# Patient Record
Sex: FEMALE | Race: BLACK OR AFRICAN AMERICAN | NOT HISPANIC OR LATINO | Employment: FULL TIME | URBAN - METROPOLITAN AREA
[De-identification: names, ages, dates, MRNs, and addresses within clinical notes are randomized per-mention and may not be internally consistent; named-entity substitution may affect disease eponyms.]

---

## 2019-03-20 ENCOUNTER — TELEPHONE (OUTPATIENT)
Dept: INTERNAL MEDICINE | Facility: CLINIC | Age: 44
End: 2019-03-20

## 2019-03-20 NOTE — TELEPHONE ENCOUNTER
Left message on  to call clinic back in regards to rescheduling her apt from Saturday 3/30/19. Dr. Ferrara will not be in clinic on that day.

## 2019-03-29 ENCOUNTER — TELEPHONE (OUTPATIENT)
Dept: INTERNAL MEDICINE | Facility: CLINIC | Age: 44
End: 2019-03-29

## 2019-03-29 NOTE — TELEPHONE ENCOUNTER
----- Message from Lyndsey Shankar sent at 3/29/2019  9:35 AM CDT -----  Contact: self  Type:  Needs Medical Advice    Who Called: pt  Symptoms (please be specific): n/a   How long has patient had these symptoms:n/a  Pharmacy name and phone #:n/a  Would the patient rather a call back or a response via [facility]? Call back  Best Call Back Number: [phone]  Additional Information: requesting call back regarding questions regarding why appt was cancelled.      Thanks,  Lyndsey Shankar

## 2019-03-29 NOTE — TELEPHONE ENCOUNTER
Spoke to patient and was advised that she had an appointment on 03/30/2019, but it was canceled and she wants to know why.  I informed the patient that the appointment was canceled due to the provider is not going to be in on that Saturday.  I offered to reschedule for another Saturday or another day.  The patient stated that she was off on Monday and requested an appointment for Monday.  While searching the appointments, patient hung the phone up.  Called the patient back and received no answer.  Left her a message.

## 2019-04-01 ENCOUNTER — TELEPHONE (OUTPATIENT)
Dept: INTERNAL MEDICINE | Facility: CLINIC | Age: 44
End: 2019-04-01

## 2019-04-01 NOTE — TELEPHONE ENCOUNTER
Pt called stating her appt for last sat was cancelled and she didn't know why but wanted another appt to est  Care with Dr Ferrara. Informed pt I was not sure why appt was cancelled but would be more than happy to assist her with making another appt with same provider. Made pt appt for same provider for same day. Pt had no further questions.

## 2019-04-01 NOTE — TELEPHONE ENCOUNTER
----- Message from Saumya Atkins sent at 4/1/2019  8:27 AM CDT -----  Contact: Pt  Type:  Sooner Apoointment Request    Caller is requesting a sooner appointment.  Caller declined first available appointment listed below.  Caller will not accept being placed on the waitlist and is requesting a message be sent to doctor.  Name of Caller: Pt   When is the first available appointment? n/a  Symptoms: EST Care/physical exam  Would the patient rather a call back or a response via [facility]? Call back  Best Call Back Number: [phone] (home)   Additional Information:  The pt is calling back to reschedule her appt and would like to get an explanation concerning why her appt was cancelled last week,please advise.

## 2019-04-03 ENCOUNTER — HOSPITAL ENCOUNTER (EMERGENCY)
Facility: HOSPITAL | Age: 44
Discharge: HOME | End: 2019-04-03
Attending: FAMILY MEDICINE
Payer: COMMERCIAL

## 2019-04-03 VITALS
WEIGHT: 133 LBS | HEART RATE: 101 BPM | DIASTOLIC BLOOD PRESSURE: 78 MMHG | RESPIRATION RATE: 18 BRPM | HEIGHT: 67 IN | OXYGEN SATURATION: 100 % | SYSTOLIC BLOOD PRESSURE: 134 MMHG | BODY MASS INDEX: 20.88 KG/M2 | TEMPERATURE: 99 F

## 2019-04-03 DIAGNOSIS — R07.9 CHEST PAIN: ICD-10-CM

## 2019-04-03 DIAGNOSIS — R52 GENERALIZED BODY ACHES: Primary | ICD-10-CM

## 2019-04-03 LAB
ALBUMIN SERPL BCP-MCNC: 4.4 G/DL (ref 3.5–5.2)
ALP SERPL-CCNC: 80 U/L (ref 55–135)
ALT SERPL W/O P-5'-P-CCNC: 16 U/L (ref 10–44)
ANION GAP SERPL CALC-SCNC: 11 MMOL/L (ref 8–16)
AST SERPL-CCNC: 14 U/L (ref 10–40)
BASOPHILS # BLD AUTO: 0.01 K/UL (ref 0–0.2)
BASOPHILS NFR BLD: 0.1 % (ref 0–1.9)
BILIRUB SERPL-MCNC: 0.4 MG/DL (ref 0.1–1)
BNP SERPL-MCNC: 11 PG/ML (ref 0–99)
BUN SERPL-MCNC: 6 MG/DL (ref 6–20)
CALCIUM SERPL-MCNC: 9.7 MG/DL (ref 8.7–10.5)
CHLORIDE SERPL-SCNC: 101 MMOL/L (ref 95–110)
CO2 SERPL-SCNC: 25 MMOL/L (ref 23–29)
CREAT SERPL-MCNC: 0.7 MG/DL (ref 0.5–1.4)
D DIMER PPP IA.FEU-MCNC: <0.19 MG/L FEU
DIFFERENTIAL METHOD: ABNORMAL
EOSINOPHIL # BLD AUTO: 0 K/UL (ref 0–0.5)
EOSINOPHIL NFR BLD: 0.6 % (ref 0–8)
ERYTHROCYTE [DISTWIDTH] IN BLOOD BY AUTOMATED COUNT: 13.2 % (ref 11.5–14.5)
EST. GFR  (AFRICAN AMERICAN): >60 ML/MIN/1.73 M^2
EST. GFR  (NON AFRICAN AMERICAN): >60 ML/MIN/1.73 M^2
GLUCOSE SERPL-MCNC: 67 MG/DL (ref 70–110)
HCT VFR BLD AUTO: 40.4 % (ref 37–48.5)
HGB BLD-MCNC: 13.8 G/DL (ref 12–16)
INFLUENZA A, MOLECULAR: NEGATIVE
INFLUENZA B, MOLECULAR: NEGATIVE
LACTATE SERPL-SCNC: 1.6 MMOL/L (ref 0.5–2.2)
LYMPHOCYTES # BLD AUTO: 0.9 K/UL (ref 1–4.8)
LYMPHOCYTES NFR BLD: 11.9 % (ref 18–48)
MCH RBC QN AUTO: 28.3 PG (ref 27–31)
MCHC RBC AUTO-ENTMCNC: 34.2 G/DL (ref 32–36)
MCV RBC AUTO: 83 FL (ref 82–98)
MONOCYTES # BLD AUTO: 0.6 K/UL (ref 0.3–1)
MONOCYTES NFR BLD: 8.1 % (ref 4–15)
NEUTROPHILS # BLD AUTO: 5.7 K/UL (ref 1.8–7.7)
NEUTROPHILS NFR BLD: 79.3 % (ref 38–73)
PLATELET # BLD AUTO: 186 K/UL (ref 150–350)
PMV BLD AUTO: 11.3 FL (ref 9.2–12.9)
POTASSIUM SERPL-SCNC: 3.2 MMOL/L (ref 3.5–5.1)
PROT SERPL-MCNC: 7.6 G/DL (ref 6–8.4)
RBC # BLD AUTO: 4.87 M/UL (ref 4–5.4)
SODIUM SERPL-SCNC: 137 MMOL/L (ref 136–145)
SPECIMEN SOURCE: NORMAL
TROPONIN I SERPL DL<=0.01 NG/ML-MCNC: <0.006 NG/ML (ref 0–0.03)
WBC # BLD AUTO: 7.15 K/UL (ref 3.9–12.7)

## 2019-04-03 PROCEDURE — 96374 THER/PROPH/DIAG INJ IV PUSH: CPT

## 2019-04-03 PROCEDURE — 83605 ASSAY OF LACTIC ACID: CPT

## 2019-04-03 PROCEDURE — 96361 HYDRATE IV INFUSION ADD-ON: CPT

## 2019-04-03 PROCEDURE — 84484 ASSAY OF TROPONIN QUANT: CPT

## 2019-04-03 PROCEDURE — 93010 EKG 12-LEAD: ICD-10-PCS | Mod: ,,, | Performed by: INTERNAL MEDICINE

## 2019-04-03 PROCEDURE — 63600175 PHARM REV CODE 636 W HCPCS: Performed by: FAMILY MEDICINE

## 2019-04-03 PROCEDURE — 99285 EMERGENCY DEPT VISIT HI MDM: CPT | Mod: 25

## 2019-04-03 PROCEDURE — 93005 ELECTROCARDIOGRAM TRACING: CPT

## 2019-04-03 PROCEDURE — 85379 FIBRIN DEGRADATION QUANT: CPT

## 2019-04-03 PROCEDURE — 93010 ELECTROCARDIOGRAM REPORT: CPT | Mod: ,,, | Performed by: INTERNAL MEDICINE

## 2019-04-03 PROCEDURE — 87502 INFLUENZA DNA AMP PROBE: CPT

## 2019-04-03 PROCEDURE — 87040 BLOOD CULTURE FOR BACTERIA: CPT | Mod: 59

## 2019-04-03 PROCEDURE — 25000003 PHARM REV CODE 250: Performed by: FAMILY MEDICINE

## 2019-04-03 PROCEDURE — 85025 COMPLETE CBC W/AUTO DIFF WBC: CPT

## 2019-04-03 PROCEDURE — 80053 COMPREHEN METABOLIC PANEL: CPT

## 2019-04-03 PROCEDURE — 83880 ASSAY OF NATRIURETIC PEPTIDE: CPT

## 2019-04-03 RX ORDER — IBUPROFEN 600 MG/1
600 TABLET ORAL
Status: COMPLETED | OUTPATIENT
Start: 2019-04-03 | End: 2019-04-03

## 2019-04-03 RX ORDER — KETOROLAC TROMETHAMINE 30 MG/ML
30 INJECTION, SOLUTION INTRAMUSCULAR; INTRAVENOUS
Status: COMPLETED | OUTPATIENT
Start: 2019-04-03 | End: 2019-04-03

## 2019-04-03 RX ORDER — TRAMADOL HYDROCHLORIDE 50 MG/1
50 TABLET ORAL EVERY 6 HOURS PRN
Qty: 12 TABLET | Refills: 0 | Status: SHIPPED | OUTPATIENT
Start: 2019-04-03 | End: 2020-02-03

## 2019-04-03 RX ADMIN — KETOROLAC TROMETHAMINE 30 MG: 30 INJECTION, SOLUTION INTRAMUSCULAR at 11:04

## 2019-04-03 RX ADMIN — IBUPROFEN 600 MG: 600 TABLET, FILM COATED ORAL at 09:04

## 2019-04-03 RX ADMIN — SODIUM CHLORIDE 1000 ML: 0.9 INJECTION, SOLUTION INTRAVENOUS at 09:04
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
## 2019-04-04 NOTE — ED NOTES
Pt ambulatory to nurses station requesting to speak with Dr. Torres. Pt states that she feels like we did not do anything for her because we did not give her antibiotics. Requested that Dr. Torres speak with patient.

## 2019-04-04 NOTE — ED PROVIDER NOTES
SCRIBE #1 NOTE: I, Rosa Spears, am scribing for, and in the presence of, Emily Torres MD. I have scribed the entire note.      History      Chief Complaint   Patient presents with    Chest Pain     intermittent L side cp that began Friday; described as sharp and stabbing then goes away    Generalized Body Aches     fever and body aches that began today; pt also reports BLE pain that began last week       Review of patient's allergies indicates:  No Known Allergies     HPI   HPI    4/3/2019, 8:44 PM   History obtained from the patient      History of Present Illness: Gonzalo Cordova is a 43 y.o. female patient with a PMHx of HLD and DM presents to the Emergency Department for CP which onset gradually this morning. Symptoms are episodic and moderate in severity. Pt describes the CP as sharp. No mitigating or exacerbating factors reported. Associated sxs include generalized myalgias, fever (Tnow= 101.0), and HA. Patient denies any  diaphoresis, chills, SOB, palpitations, abd pain, N/V/D, back pain, lightheadedness, extremity weakness/numbness, and all other sxs at this time. Prior Tx includes BC powder with no relief of sxs. Pt reports she visited [facility] earlier today and was administered Tynenol around 1600 which did not resolve the fever. No further complaints or concerns at this time.         Arrival mode: Personal vehicle      PCP: Shara Ferrara MD       Past Medical History:  Past Medical History:   Diagnosis Date    Chest pain syndrome 2014    Diabetes mellitus 2014    Hyperlipidemia        Past Surgical History:  Past surgical history reviewed not relevant    Family History:  Family History   Problem Relation Age of Onset    Stroke Father     Hypertension Father        Social History:  Social History     Tobacco Use    Smoking status: Former Smoker     Packs/day: 1.00     Start date: 2014     Last attempt to quit: 10/16/2014     Years since quittin.4   Substance and  Sexual Activity    Alcohol use: Yes     Alcohol/week: 0.0 oz     Comment: occassionally    Drug use: No    Sexual activity: Unknown       ROS   Review of Systems   Constitutional: Positive for fever (Wfcm=754.0). Negative for chills and diaphoresis.   HENT: Negative for sore throat.    Respiratory: Negative for shortness of breath.    Cardiovascular: Positive for chest pain. Negative for palpitations.   Gastrointestinal: Negative for abdominal pain, diarrhea, nausea and vomiting.   Genitourinary: Negative for dysuria.   Musculoskeletal: Positive for myalgias. Negative for back pain.   Skin: Negative for rash.   Neurological: Positive for headaches. Negative for weakness, light-headedness and numbness.   Hematological: Does not bruise/bleed easily.   All other systems reviewed and are negative.    Physical Exam      Initial Vitals [04/03/19 1948]   BP Pulse Resp Temp SpO2   135/78 (!) 118 16 (!) 101 °F (38.3 °C) 96 %      MAP       --          Physical Exam  Nursing Notes and Vital Signs Reviewed.  Constitutional: Patient is in no acute distress. Well-developed and well-nourished.  Head: Atraumatic. Normocephalic.  Eyes: PERRL. EOM intact. Conjunctivae are not pale. No scleral icterus.  ENT: Mucous membranes are moist. Oropharynx is clear and symmetric.    Neck: Supple. Full ROM. No lymphadenopathy.  Cardiovascular: Regular rate. Regular rhythm. No murmurs, rubs, or gallops. Distal pulses are 2+ and symmetric.  Pulmonary/Chest: No respiratory distress. Clear to auscultation bilaterally. No wheezing or rales.  Abdominal: Soft and non-distended. There is no tenderness.  No rebound, guarding, or rigidity. Good bowel sounds.   Genitourinary: No CVA tenderness  Musculoskeletal: Moves all extremities. No obvious deformities. No edema. No calf tenderness.  Skin: Warm and dry.  Neurological:  Alert, awake, and appropriate.  Normal speech.  No acute focal neurological deficits are appreciated.  Psychiatric: Normal affect.  "Good eye contact. Appropriate in content.    ED Course    Procedures  ED Vital Signs:  Vitals:    04/03/19 1948 04/03/19 2120 04/03/19 2208   BP: 135/78     Pulse: (!) 118     Resp: 16     Temp: (!) 101 °F (38.3 °C) (!) 100.7 °F (38.2 °C) 99.8 °F (37.7 °C)   TempSrc: Oral  Oral   SpO2: 96%     Weight: 60.3 kg (133 lb)     Height: 5' 7" (1.702 m)         Abnormal Lab Results:  Labs Reviewed   CBC W/ AUTO DIFFERENTIAL - Abnormal; Notable for the following components:       Result Value    Lymph # 0.9 (*)     Gran% 79.3 (*)     Lymph% 11.9 (*)     All other components within normal limits   COMPREHENSIVE METABOLIC PANEL - Abnormal; Notable for the following components:    Potassium 3.2 (*)     Glucose 67 (*)     All other components within normal limits   INFLUENZA A & B BY MOLECULAR   CULTURE, BLOOD   CULTURE, BLOOD   LACTIC ACID, PLASMA   TROPONIN I   B-TYPE NATRIURETIC PEPTIDE   D DIMER, QUANTITATIVE   HIV 1 / 2 ANTIBODY   URINALYSIS        All Lab Results:  Results for orders placed or performed during the hospital encounter of 04/03/19   Influenza A & B by Molecular   Result Value Ref Range    Influenza A, Molecular Negative Negative    Influenza B, Molecular Negative Negative    Flu A & B Source Nasal swab    CBC auto differential   Result Value Ref Range    WBC 7.15 3.90 - 12.70 K/uL    RBC 4.87 4.00 - 5.40 M/uL    Hemoglobin 13.8 12.0 - 16.0 g/dL    Hematocrit 40.4 37.0 - 48.5 %    MCV 83 82 - 98 fL    MCH 28.3 27.0 - 31.0 pg    MCHC 34.2 32.0 - 36.0 g/dL    RDW 13.2 11.5 - 14.5 %    Platelets 186 150 - 350 K/uL    MPV 11.3 9.2 - 12.9 fL    Gran # (ANC) 5.7 1.8 - 7.7 K/uL    Lymph # 0.9 (L) 1.0 - 4.8 K/uL    Mono # 0.6 0.3 - 1.0 K/uL    Eos # 0.0 0.0 - 0.5 K/uL    Baso # 0.01 0.00 - 0.20 K/uL    Gran% 79.3 (H) 38.0 - 73.0 %    Lymph% 11.9 (L) 18.0 - 48.0 %    Mono% 8.1 4.0 - 15.0 %    Eosinophil% 0.6 0.0 - 8.0 %    Basophil% 0.1 0.0 - 1.9 %    Differential Method Automated    Comprehensive metabolic panel "   Result Value Ref Range    Sodium 137 136 - 145 mmol/L    Potassium 3.2 (L) 3.5 - 5.1 mmol/L    Chloride 101 95 - 110 mmol/L    CO2 25 23 - 29 mmol/L    Glucose 67 (L) 70 - 110 mg/dL    BUN, Bld 6 6 - 20 mg/dL    Creatinine 0.7 0.5 - 1.4 mg/dL    Calcium 9.7 8.7 - 10.5 mg/dL    Total Protein 7.6 6.0 - 8.4 g/dL    Albumin 4.4 3.5 - 5.2 g/dL    Total Bilirubin 0.4 0.1 - 1.0 mg/dL    Alkaline Phosphatase 80 55 - 135 U/L    AST 14 10 - 40 U/L    ALT 16 10 - 44 U/L    Anion Gap 11 8 - 16 mmol/L    eGFR if African American >60 >60 mL/min/1.73 m^2    eGFR if non African American >60 >60 mL/min/1.73 m^2   Lactic acid, plasma   Result Value Ref Range    Lactate (Lactic Acid) 1.6 0.5 - 2.2 mmol/L   Troponin I   Result Value Ref Range    Troponin I <0.006 0.000 - 0.026 ng/mL   B-Type natriuretic peptide (BNP)   Result Value Ref Range    BNP 11 0 - 99 pg/mL   D dimer, quantitative   Result Value Ref Range    D-Dimer <0.19 <0.50 mg/L FEU         Imaging Results:  Imaging Results          X-Ray Chest AP Portable (Final result)  Result time 04/03/19 21:05:38    Final result by NANETTE Valencia Sr., MD (04/03/19 21:05:38)                 Impression:      Normal study.      Electronically signed by: Chas Valencia MD  Date:    04/03/2019  Time:    21:05             Narrative:    EXAMINATION:  XR CHEST AP PORTABLE    CLINICAL HISTORY:  Chest Pain;    COMPARISON:  None    FINDINGS:  The size of the heart is normal. The lungs are clear. There is no pneumothorax.  The costophrenic angles are sharp.                                    The EKG was ordered, reviewed, and independently interpreted by the ED provider.  Interpretation time: 2003  Rate: 111 BPM  Rhythm: sinus tachycardia  Interpretation: No acute ST changes. No STEMI.    The Emergency Provider reviewed the vital signs and test results, which are outlined above.    ED Discussion     11:38 PM: Reassessed pt at this time. Discussed with pt all pertinent ED information and  results. Discussed pt dx and plan of tx. Gave pt all f/u and return to the ED instructions. All questions and concerns were addressed at this time. Pt expresses understanding of information and instructions, and is comfortable with plan to discharge. Pt is stable for discharge.    I have discussed with patient and/or family/caretaker chest pain precautions, specifically to return for worsening chest pain, shortness of breath, fever, or any concern.  I have low suspicion for cardiopulmonary, vascular, infectious, respiratory, or other emergent medical condition based on my evaluation in the ED.      ED Medication(s):  Medications   sodium chloride 0.9% bolus 1,000 mL (0 mLs Intravenous Stopped 4/3/19 2325)   ibuprofen tablet 600 mg (600 mg Oral Given 4/3/19 2120)   ketorolac injection 30 mg (30 mg Intravenous Given 4/3/19 2325)     New Prescriptions    TRAMADOL (ULTRAM) 50 MG TABLET    Take 1 tablet (50 mg total) by mouth every 6 (six) hours as needed for Pain.       Follow-up Information     Shara Ferrara MD. Schedule an appointment as soon as possible for a visit in 1 week.    Specialty:  Family Medicine  Why:  As needed  Contact information:  [address] [place], [place] [address]V[place]AL [place] [address] [phone]                     Medical Decision Making    Medical Decision Making:   Clinical Tests:   Lab Tests: Ordered and Reviewed  Radiological Study: Ordered and Reviewed  Medical Tests: Ordered and Reviewed           Scribe Attestation:   Scribe #1: I performed the above scribed service and the documentation accurately describes the services I performed. I attest to the accuracy of the note.    Attending:   Physician Attestation Statement for Scribe #1: I, Emily Torres MD, personally performed the services described in this documentation, as scribed by Rosa Spears, in my presence, and it is both accurate and complete.          Clinical Impression       ICD-10-CM ICD-9-CM   1. Generalized body aches R52 780.96   2.  Chest pain R07.9 786.50       Disposition:   Disposition: Discharged  Condition: Stable         Emily Torres MD  04/05/19 8360

## 2019-04-09 LAB
BACTERIA BLD CULT: NORMAL
BACTERIA BLD CULT: NORMAL

## 2019-06-17 ENCOUNTER — PATIENT OUTREACH (OUTPATIENT)
Dept: ADMINISTRATIVE | Facility: HOSPITAL | Age: 44
End: 2019-06-17

## 2019-06-17 NOTE — LETTER
June 17, 2019        Gonzalo Cordova  [address] [facility] [address]      Dear MsMervin Cordova,    You have an upcoming appointment with Shara Ferrara MD on 07/01/19.      Your chart is indicating you may be due for the following and I will be happy to assist you in scheduling any needed appointments:  Health Maintenance Due   Topic    Lipid Panel     Eye Exam     TETANUS VACCINE     Pap Smear with HPV Cotest     Foot Exam     Mammogram     Hemoglobin A1c           If you have had any of the above done at another facility, please bring the records or information with you so that your record at [facility] will be complete.    We will be happy to assist you with scheduling any necessary appointments or you may contact the [facility] appointment desk at [phone] to schedule at your convenience.     Thank you for choosing [facility] for your healthcare needs,      Emily KENNEDY, LPN Care Coordinator  [facility]  [phone]

## 2019-12-18 ENCOUNTER — TELEPHONE (OUTPATIENT)
Dept: INTERNAL MEDICINE | Facility: CLINIC | Age: 44
End: 2019-12-18
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
## 2020-01-20 ENCOUNTER — PATIENT OUTREACH (OUTPATIENT)
Dept: ADMINISTRATIVE | Facility: HOSPITAL | Age: 45
End: 2020-01-20

## 2020-01-20 NOTE — LETTER
January 20, 2020        Gonzalo Cordova  [address] [facility] [address]      Dear MsMervin Cordova,    You have an upcoming appointment with Shara Ferrara MD on 02/03/20.      Your chart is indicating you may be due for the following and I will be happy to assist you in scheduling any needed appointments:  Health Maintenance Due   Topic    Lipid Panel     Eye Exam     TETANUS VACCINE     Pap Smear with HPV Cotest     Foot Exam     Mammogram     Hemoglobin A1c           If you have had any of the above done at another facility, please bring the records or information with you so that your record at [facility] will be complete.    We will be happy to assist you with scheduling any necessary appointments or you may contact the [facility] appointment desk at [phone] to schedule at your convenience.     Thank you for choosing [facility] for your healthcare needs,      Emily KENNEDY, LPN Care Coordinator   [facility]  [phone]

## 2020-02-03 ENCOUNTER — OFFICE VISIT (OUTPATIENT)
Dept: INTERNAL MEDICINE | Facility: CLINIC | Age: 45
End: 2020-02-03
Payer: COMMERCIAL

## 2020-02-03 ENCOUNTER — TELEPHONE (OUTPATIENT)
Dept: INTERNAL MEDICINE | Facility: CLINIC | Age: 45
End: 2020-02-03

## 2020-02-03 VITALS
BODY MASS INDEX: 20.43 KG/M2 | DIASTOLIC BLOOD PRESSURE: 85 MMHG | HEART RATE: 94 BPM | TEMPERATURE: 98 F | OXYGEN SATURATION: 98 % | HEIGHT: 67 IN | WEIGHT: 130.19 LBS | SYSTOLIC BLOOD PRESSURE: 133 MMHG

## 2020-02-03 DIAGNOSIS — Z11.4 SCREENING FOR HIV WITHOUT PRESENCE OF RISK FACTORS: ICD-10-CM

## 2020-02-03 DIAGNOSIS — Z87.891 HISTORY OF CIGARETTE SMOKING: ICD-10-CM

## 2020-02-03 DIAGNOSIS — R00.2 PALPITATIONS: ICD-10-CM

## 2020-02-03 DIAGNOSIS — E11.69 HYPERLIPIDEMIA ASSOCIATED WITH TYPE 2 DIABETES MELLITUS: ICD-10-CM

## 2020-02-03 DIAGNOSIS — E78.5 HYPERLIPIDEMIA ASSOCIATED WITH TYPE 2 DIABETES MELLITUS: ICD-10-CM

## 2020-02-03 PROCEDURE — 99205 OFFICE O/P NEW HI 60 MIN: CPT | Mod: S$GLB,,, | Performed by: FAMILY MEDICINE

## 2020-02-03 PROCEDURE — 99999 PR PBB SHADOW E&M-EST. PATIENT-LVL III: ICD-10-PCS | Mod: PBBFAC,,, | Performed by: FAMILY MEDICINE

## 2020-02-03 PROCEDURE — 99205 PR OFFICE/OUTPT VISIT, NEW, LEVL V, 60-74 MIN: ICD-10-PCS | Mod: S$GLB,,, | Performed by: FAMILY MEDICINE

## 2020-02-03 PROCEDURE — 99999 PR PBB SHADOW E&M-EST. PATIENT-LVL III: CPT | Mod: PBBFAC,,, | Performed by: FAMILY MEDICINE

## 2020-02-03 RX ORDER — CANAGLIFLOZIN 300 MG/1
TABLET, FILM COATED ORAL
COMMUNITY
Start: 2019-12-02 | End: 2020-02-27 | Stop reason: SDUPTHER

## 2020-02-03 RX ORDER — METHOCARBAMOL 500 MG/1
TABLET, FILM COATED ORAL
COMMUNITY
Start: 2020-01-28 | End: 2021-04-05

## 2020-02-03 RX ORDER — NAPROXEN 500 MG/1
TABLET ORAL
COMMUNITY
Start: 2020-01-28 | End: 2020-12-07

## 2020-02-03 RX ORDER — DEXTROAMPHETAMINE SACCHARATE, AMPHETAMINE ASPARTATE, DEXTROAMPHETAMINE SULFATE AND AMPHETAMINE SULFATE 2.5; 2.5; 2.5; 2.5 MG/1; MG/1; MG/1; MG/1
1 TABLET ORAL DAILY PRN
COMMUNITY
Start: 2019-11-26 | End: 2020-12-07

## 2020-02-03 NOTE — TELEPHONE ENCOUNTER
----- Message from Sinai Orozco sent at 2/3/2020 10:48 AM CST -----  Contact: pt  Pt went to the [facility] location. Pt is on the way.
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
## 2020-02-05 ENCOUNTER — TELEPHONE (OUTPATIENT)
Dept: INTERNAL MEDICINE | Facility: CLINIC | Age: 45
End: 2020-02-05

## 2020-02-05 ENCOUNTER — TELEPHONE (OUTPATIENT)
Dept: DIABETES | Facility: CLINIC | Age: 45
End: 2020-02-05

## 2020-02-05 NOTE — TELEPHONE ENCOUNTER
----- Message from Pili Olvera sent at 2/5/2020  4:34 PM CST -----  Contact: Self- [phone]  .Type:  Patient Returning Call    Who Called:Gonzalo Cordova  Who Left Message for Patient:Unsure   Does the patient know what this is regarding?:Unsure   Would the patient rather a call back or a response via [facility]? Call back   Best Call Back Number:.[phone] (home)   Additional Information:       Thank You,   Pili Olvera

## 2020-02-05 NOTE — TELEPHONE ENCOUNTER
----- Message from Gauri Underwood sent at 2/5/2020  4:17 PM CST -----  Contact: Pt   Pt is calling .Type:  Needs Medical Advice    Who Called: Pt   Symptoms (please be specific):  Chills and sinus infection   How long has patient had these symptoms: during visit/ yesterday   Pharmacy name and phone #:  .  [facility] [address] [place] [id number]  Phone: [phone] Fax: [phone]  Would the patient rather a call back or a response via [facility]?  Call Back   Best Call Back Number:  [phone]         .Thank You  Gauri Underwood

## 2020-02-05 NOTE — TELEPHONE ENCOUNTER
Pt states that she has a sinus infection, body aches, and can't breathe, states no fever. Would like a call to see what she can take, informed her that she might need to come in. Would like a call first. Please advise.

## 2020-02-06 NOTE — TELEPHONE ENCOUNTER
First number - mailbox is full. Second number: L/M if having trouble breathing needs to be seen tonight - urgently. Will try to call back in AM.

## 2020-02-06 NOTE — TELEPHONE ENCOUNTER
Pt retd call -no SOB.  Using tylenol sinus type med. rec addition of cough med such as robitussin DM. Feeling a little better today after resting well last night.  Asked her to call back tomorrow if worsening/further questions

## 2020-02-09 ENCOUNTER — TELEPHONE (OUTPATIENT)
Dept: INTERNAL MEDICINE | Facility: CLINIC | Age: 45
End: 2020-02-09

## 2020-02-10 NOTE — TELEPHONE ENCOUNTER
"There are 8 labs to be ordered - 7 are blood, one is urine. They all have an "expected now" status in the Order Review tab. (she had scheduled lab draw for 2/7/2020 and did not show - so those orders may have to be released from that appt to reschedule)    Thanks.  "

## 2020-02-10 NOTE — TELEPHONE ENCOUNTER
Patient has been scheduled for 02/12/2020. Please advise if the patient needs anything else besides the urine.

## 2020-02-14 ENCOUNTER — TELEPHONE (OUTPATIENT)
Dept: INTERNAL MEDICINE | Facility: CLINIC | Age: 45
End: 2020-02-14

## 2020-02-14 NOTE — TELEPHONE ENCOUNTER
----- Message from Naga Perry sent at 2/14/2020 10:58 AM CST -----  Contact: pt   Type:  Needs Medical Advice    Who Called: GABY SANCHEZ   Symptoms (please be specific):  How long has patient had these symptoms:   Pharmacy name and phone #:   Would the patient rather a call back or a response via [facility]? Call   Best Call Back Number:  [phone] (home)    Additional Information:   Pt is requesting a call back from the nurse in regards to the pt missed nurse visit please

## 2020-02-24 ENCOUNTER — TELEPHONE (OUTPATIENT)
Dept: INTERNAL MEDICINE | Facility: CLINIC | Age: 45
End: 2020-02-24

## 2020-02-24 NOTE — TELEPHONE ENCOUNTER
----- Message from Rut Lyons sent at 2/24/2020  2:12 PM CST -----  Contact: pt  Please give pt a call at .[phone] (home) she is returning the nurse call

## 2020-02-24 NOTE — TELEPHONE ENCOUNTER
----- Message from Rut Lyons sent at 2/24/2020 12:49 PM CST -----  Contact: pt  Please give pt a call at .[phone] (home) she is calling to have her labs scheduled this week

## 2020-02-27 NOTE — TELEPHONE ENCOUNTER
Spoke with the patient asking a refill. Last OV 02/03/20.  Patient requesting the ADDERRAL to send to [facility], [address], [address].   GLYBURIDE-METFORMIN, JASPER, AND JANUVIA @ [facility] @ [facility], [place], [place]. Please advise.

## 2020-02-27 NOTE — TELEPHONE ENCOUNTER
----- Message from Mona Munoz sent at 2/27/2020 12:13 PM CST -----  Contact: self [phone]  Type:  Patient Returning Call    Who Called: Gonzalo Cordova  Who Left Message for Patient: unk  Does the patient know what this is regarding?:appt  Would the patient rather a call back or a response via [facility]? Call back   Best Call Back Number:[phone]  Additional Information: States that she is having problems with her phone and if no answer to call right back.

## 2020-02-28 RX ORDER — CANAGLIFLOZIN 300 MG/1
300 TABLET, FILM COATED ORAL DAILY
Qty: 90 TABLET | Refills: 0 | Status: SHIPPED | OUTPATIENT
Start: 2020-02-28 | End: 2021-04-05 | Stop reason: SDUPTHER

## 2020-02-28 RX ORDER — GLYBURIDE-METFORMIN HYDROCHLORIDE 5; 500 MG/1; MG/1
2 TABLET ORAL 2 TIMES DAILY WITH MEALS
Qty: 180 TABLET | Refills: 0 | Status: SHIPPED | OUTPATIENT
Start: 2020-02-28 | End: 2021-04-05 | Stop reason: SDUPTHER

## 2020-02-28 RX ORDER — DEXTROAMPHETAMINE SACCHARATE, AMPHETAMINE ASPARTATE, DEXTROAMPHETAMINE SULFATE AND AMPHETAMINE SULFATE 2.5; 2.5; 2.5; 2.5 MG/1; MG/1; MG/1; MG/1
1 TABLET ORAL DAILY PRN
OUTPATIENT
Start: 2020-02-28

## 2020-02-28 NOTE — TELEPHONE ENCOUNTER
----- Message from Michelle Carroll sent at 2/28/2020  3:44 PM CST -----  Contact: [phone]  Would like to consult with the nurse, patient keeps missing calls from the Office and would like to speak with the nurse , Please call back at [phone], thanks sj

## 2020-02-29 NOTE — TELEPHONE ENCOUNTER
Prescribe 90 days for the 3 mail-order medicines she requested.  (Note that Chanuvia was not listed as 1 of her medications when I saw her 3 weeks ago as a new patient.)    She did not yet get her blood work drawn.  I cannot prescribe any further medication until she gets her blood work.    I did not refill Adderall.  I do not have records from her diagnosing DrMervin.  I do not have a diagnosis for why she is taking this. She needs another visit to discuss.    If unable to reach by phone, send letter.

## 2020-03-02 NOTE — TELEPHONE ENCOUNTER
Spoke with the pt informing the rx was sent to mail order except the adderral. Patient verbally understood.

## 2020-03-03 ENCOUNTER — TELEPHONE (OUTPATIENT)
Dept: DIABETES | Facility: CLINIC | Age: 45
End: 2020-03-03

## 2020-03-03 ENCOUNTER — PATIENT OUTREACH (OUTPATIENT)
Dept: ADMINISTRATIVE | Facility: HOSPITAL | Age: 45
End: 2020-03-03

## 2020-03-19 ENCOUNTER — LAB VISIT (OUTPATIENT)
Dept: LAB | Facility: HOSPITAL | Age: 45
End: 2020-03-19
Attending: FAMILY MEDICINE
Payer: COMMERCIAL

## 2020-03-19 DIAGNOSIS — R00.2 PALPITATIONS: ICD-10-CM

## 2020-03-19 DIAGNOSIS — Z11.4 SCREENING FOR HIV WITHOUT PRESENCE OF RISK FACTORS: ICD-10-CM

## 2020-03-19 DIAGNOSIS — E78.5 HYPERLIPIDEMIA ASSOCIATED WITH TYPE 2 DIABETES MELLITUS: ICD-10-CM

## 2020-03-19 DIAGNOSIS — E11.69 HYPERLIPIDEMIA ASSOCIATED WITH TYPE 2 DIABETES MELLITUS: ICD-10-CM

## 2020-03-19 LAB
ALBUMIN SERPL BCP-MCNC: 4.1 G/DL (ref 3.5–5.2)
ALBUMIN/CREAT UR: 6.8 UG/MG (ref 0–30)
ALP SERPL-CCNC: 92 U/L (ref 55–135)
ALT SERPL W/O P-5'-P-CCNC: 12 U/L (ref 10–44)
ANION GAP SERPL CALC-SCNC: 9 MMOL/L (ref 8–16)
AST SERPL-CCNC: 10 U/L (ref 10–40)
BASOPHILS # BLD AUTO: 0.05 K/UL (ref 0–0.2)
BASOPHILS NFR BLD: 0.7 % (ref 0–1.9)
BILIRUB SERPL-MCNC: 0.3 MG/DL (ref 0.1–1)
BUN SERPL-MCNC: 6 MG/DL (ref 6–20)
CALCIUM SERPL-MCNC: 9.3 MG/DL (ref 8.7–10.5)
CHLORIDE SERPL-SCNC: 103 MMOL/L (ref 95–110)
CHOLEST SERPL-MCNC: 198 MG/DL (ref 120–199)
CHOLEST/HDLC SERPL: 4 {RATIO} (ref 2–5)
CO2 SERPL-SCNC: 28 MMOL/L (ref 23–29)
CREAT SERPL-MCNC: 0.7 MG/DL (ref 0.5–1.4)
CREAT UR-MCNC: 74 MG/DL (ref 15–325)
DIFFERENTIAL METHOD: ABNORMAL
EOSINOPHIL # BLD AUTO: 0.1 K/UL (ref 0–0.5)
EOSINOPHIL NFR BLD: 1.7 % (ref 0–8)
ERYTHROCYTE [DISTWIDTH] IN BLOOD BY AUTOMATED COUNT: 13.2 % (ref 11.5–14.5)
EST. GFR  (AFRICAN AMERICAN): >60 ML/MIN/1.73 M^2
EST. GFR  (NON AFRICAN AMERICAN): >60 ML/MIN/1.73 M^2
ESTIMATED AVG GLUCOSE: 235 MG/DL (ref 68–131)
GLUCOSE SERPL-MCNC: 209 MG/DL (ref 70–110)
HBA1C MFR BLD HPLC: 9.8 % (ref 4–5.6)
HCT VFR BLD AUTO: 44.9 % (ref 37–48.5)
HDLC SERPL-MCNC: 50 MG/DL (ref 40–75)
HDLC SERPL: 25.3 % (ref 20–50)
HGB BLD-MCNC: 14.1 G/DL (ref 12–16)
IMM GRANULOCYTES # BLD AUTO: 0.02 K/UL (ref 0–0.04)
IMM GRANULOCYTES NFR BLD AUTO: 0.3 % (ref 0–0.5)
LDLC SERPL CALC-MCNC: 129.4 MG/DL (ref 63–159)
LYMPHOCYTES # BLD AUTO: 3.3 K/UL (ref 1–4.8)
LYMPHOCYTES NFR BLD: 48.3 % (ref 18–48)
MCH RBC QN AUTO: 27.5 PG (ref 27–31)
MCHC RBC AUTO-ENTMCNC: 31.4 G/DL (ref 32–36)
MCV RBC AUTO: 88 FL (ref 82–98)
MICROALBUMIN UR DL<=1MG/L-MCNC: 5 UG/ML
MONOCYTES # BLD AUTO: 0.4 K/UL (ref 0.3–1)
MONOCYTES NFR BLD: 5.7 % (ref 4–15)
NEUTROPHILS # BLD AUTO: 3 K/UL (ref 1.8–7.7)
NEUTROPHILS NFR BLD: 43.3 % (ref 38–73)
NONHDLC SERPL-MCNC: 148 MG/DL
NRBC BLD-RTO: 0 /100 WBC
PLATELET # BLD AUTO: 229 K/UL (ref 150–350)
PMV BLD AUTO: 12.5 FL (ref 9.2–12.9)
POTASSIUM SERPL-SCNC: 3.8 MMOL/L (ref 3.5–5.1)
PROT SERPL-MCNC: 7.3 G/DL (ref 6–8.4)
RBC # BLD AUTO: 5.12 M/UL (ref 4–5.4)
SODIUM SERPL-SCNC: 140 MMOL/L (ref 136–145)
TRIGL SERPL-MCNC: 93 MG/DL (ref 30–150)
TSH SERPL DL<=0.005 MIU/L-ACNC: 0.49 UIU/ML (ref 0.4–4)
WBC # BLD AUTO: 6.9 K/UL (ref 3.9–12.7)

## 2020-03-19 PROCEDURE — 80061 LIPID PANEL: CPT

## 2020-03-19 PROCEDURE — 36415 COLL VENOUS BLD VENIPUNCTURE: CPT

## 2020-03-19 PROCEDURE — 80053 COMPREHEN METABOLIC PANEL: CPT

## 2020-03-19 PROCEDURE — 82043 UR ALBUMIN QUANTITATIVE: CPT

## 2020-03-19 PROCEDURE — 83036 HEMOGLOBIN GLYCOSYLATED A1C: CPT

## 2020-03-19 PROCEDURE — 86703 HIV-1/HIV-2 1 RESULT ANTBDY: CPT

## 2020-03-19 PROCEDURE — 86341 ISLET CELL ANTIBODY: CPT

## 2020-03-19 PROCEDURE — 85025 COMPLETE CBC W/AUTO DIFF WBC: CPT

## 2020-03-19 PROCEDURE — 84443 ASSAY THYROID STIM HORMONE: CPT

## 2020-03-20 ENCOUNTER — TELEPHONE (OUTPATIENT)
Dept: INTERNAL MEDICINE | Facility: CLINIC | Age: 45
End: 2020-03-20

## 2020-03-20 LAB — HIV 1+2 AB+HIV1 P24 AG SERPL QL IA: NEGATIVE

## 2020-03-20 NOTE — TELEPHONE ENCOUNTER
For last 3 days - having chills, used BC powder. Felt well - then occurred again yesterday - got temp checked yest for labs - afebrile - HA, no different from usual. No myalgias.      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

## 2020-03-20 NOTE — TELEPHONE ENCOUNTER
Spoke with the patient stating she's start having bad chills/cold since Tuesday but when she get up from bed she is doing fine, no other symptoms. 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 Patient asking if she can have a note that state she need to full out from this job or does she needs to come to the clinic. Please advise.

## 2020-03-20 NOTE — TELEPHONE ENCOUNTER
----- Message from Aarti Muniz sent at 3/20/2020  9:17 AM CDT -----  Contact: Patient  Patient states that she feels like she is getting a cold and would like to be advised, patient states that she does not have COVID-19, but has DM and is concerned if she should go to work or not, please call her back at [phone]. Thank you

## 2020-03-23 ENCOUNTER — TELEPHONE (OUTPATIENT)
Dept: INTERNAL MEDICINE | Facility: CLINIC | Age: 45
End: 2020-03-23

## 2020-03-23 NOTE — TELEPHONE ENCOUNTER
----- Message from Clotilde Duran sent at 3/23/2020  4:07 PM CDT -----  Contact: pt  She's calling in regards to speak with nurse      pls call pt back at [phone] (home)

## 2020-03-23 NOTE — TELEPHONE ENCOUNTER
Spoke with the patient stating she was advised to call back Monday if still have same symptoms which is chills. Patient state she is taking tamiflu, tylenol and Jardiance. Patient asking if what she needed to do or have a note from the doctor stating she can't be a work being at risk from COVID-19. Patient asking if can Dr. Ferrara give her a call. Please advise.

## 2020-03-23 NOTE — TELEPHONE ENCOUNTER
----- Message from Aarti Muniz sent at 3/23/2020  9:48 AM CDT -----  Contact: Patient  Patient is returning a call, please call them back at [phone]. Thank you

## 2020-03-24 ENCOUNTER — TELEPHONE (OUTPATIENT)
Dept: INTERNAL MEDICINE | Facility: CLINIC | Age: 45
End: 2020-03-24

## 2020-03-24 NOTE — TELEPHONE ENCOUNTER
Spoke with the patient asking Dr. Ferrara to give her a call. Patient state still have chills taking theraflu  not tamiflu, BC's, victoza not januvia and other med she told Dr. Ferrara. Patient has informed the lab test result and the pending test result that Dr. Ferrara waited. Please advise.

## 2020-03-24 NOTE — TELEPHONE ENCOUNTER
I spoke with the patient.  Documentation from today's call actually appears under the 03/20/2020 telephone message.    Corrections made to meds.

## 2020-03-24 NOTE — TELEPHONE ENCOUNTER
----- Message from Maddie Dunn sent at 3/24/2020  9:26 AM CDT -----  Contact: pt  Type:  Patient Returning Call    Who Called:pt  Who Left Message for Patient:nurse  Does the patient know what this is regarding?:  Would the patient rather a call back or a response via [facility]? Call back  Best Call Back Number:[phone]  Additional Information:

## 2020-03-24 NOTE — TELEPHONE ENCOUNTER
Recheck on pt - she is taking tylenol products - had head congestion last night causing HA. Not having myalgias, rare cough, chills still occasional.  She is going to stay off work through end of week - call me Monday if back at work as expected - she is symptom free today but taking meds around the clock. Advised to hold off on meds - OK to take them if symptoms recur but she won't know whether symptom free while taking continually.    She is advised to call to reschedule her appt with Prema in DM. And garrett appt with me to review DM treatment and adjust.

## 2020-03-25 LAB — GAD65 AB SER-SCNC: 0.01 NMOL/L

## 2020-03-30 ENCOUNTER — TELEPHONE (OUTPATIENT)
Dept: INTERNAL MEDICINE | Facility: CLINIC | Age: 45
End: 2020-03-30

## 2020-03-30 NOTE — TELEPHONE ENCOUNTER
Spoke with the patient stating Dr. Ferrara advised her to call today for a follow-up regarding her chills and having sore throat.. Patient has informed the note from last discussion on telephone. Patient refused to schedule the follow -up appointment with Prema and Dr. Ferrara.    Patient asking Dr. Ferrara to give her a call back. Please advise.

## 2020-03-30 NOTE — TELEPHONE ENCOUNTER
----- Message from Cate Staton sent at 3/30/2020  8:38 AM CDT -----  Contact: self  Pt called in stating that Dr. Ferrara advised her to call in for a follow up. She is requesting to speak to Dr. Ferrara. Please call pt back at [phone]

## 2020-03-31 NOTE — TELEPHONE ENCOUNTER
----- Message from Pau Mendez sent at 3/31/2020  9:19 AM CDT -----  Contact: pt  .Type:  Patient Returning Call    Who Called: pt  Who Left Message for Patient:   Does the patient know what this is regarding?:   Would the patient rather a call back or a response via [facility]?  Call back   Best Call Back Number:     [phone]  Additional Information:

## 2020-03-31 NOTE — TELEPHONE ENCOUNTER
Called the patient. Patient state she is in the phone with other doctor. State will call back. Verbally understood.

## 2020-04-02 ENCOUNTER — TELEPHONE (OUTPATIENT)
Dept: INTERNAL MEDICINE | Facility: CLINIC | Age: 45
End: 2020-04-02

## 2020-04-02 DIAGNOSIS — R68.83 CHILLS: ICD-10-CM

## 2020-04-02 DIAGNOSIS — R51.9 NONINTRACTABLE HEADACHE, UNSPECIFIED CHRONICITY PATTERN, UNSPECIFIED HEADACHE TYPE: ICD-10-CM

## 2020-04-02 DIAGNOSIS — J02.9 SORE THROAT: Primary | ICD-10-CM

## 2020-04-02 NOTE — TELEPHONE ENCOUNTER
----- Message from Saumya Lee sent at 4/1/2020  4:22 PM CDT -----  Contact: Pt  Type:  Patient Returning Call    Who Called: Gonzalo Cordova  Who Left Message for Patient: Alem  Does the patient know what this is regarding?: Yes  Would the patient rather a call back or a response via [facility]? Call Back  Best Call Back Number: [phone] (home)   Additional Information: Pt has been having sore throat, chills but go away with medicine,

## 2020-04-02 NOTE — TELEPHONE ENCOUNTER
Spoke with the patient stating having a sore throat and chills that it comes on and off. Patient state she is taking nightquil. Please advise.    Send to:  [facility] [address], [place] - [address] [address]

## 2020-04-03 ENCOUNTER — TELEPHONE (OUTPATIENT)
Dept: INTERNAL MEDICINE | Facility: CLINIC | Age: 45
End: 2020-04-03

## 2020-04-03 NOTE — TELEPHONE ENCOUNTER
Spoke with patient she has continued to have chills.  Now since Sunday she has had a tickle in her throat scratchy throat. No SOB, no cough, no dyspnea.  She has poorly controlled diabetes.    She is a .  She is at high risk of lida COVID 19 as well as high risk of complications and severe disease if she does contract.  I recommend she practice strict social isolation to avoid contact/contraction a virus.  Letter to be written.  It will be faxed to her company at [phone].  Patient also asked that a copy be mailed to her. Advised will be faxed Monday.            Advised to call for a video visit that we can use to control her diabetes.  Also advised to reschedule her appointment with diabetes department

## 2020-04-03 NOTE — TELEPHONE ENCOUNTER
called the patient in regards of disability paper work needed to schedule. No answer. Left message to call the clinic back.

## 2020-04-03 NOTE — TELEPHONE ENCOUNTER
----- Message from Douglas Rangel sent at 4/3/2020  3:33 PM CDT -----  Contact: self [phone]  .Type:  Patient Returning Call    Who Called:Gonzalo Cordova  Who Left Message for Patient:Andrew  Does the patient know what this is regarding?:  Would the patient rather a call back or a response via [facility]? Call back  Best Call Back Number:[phone]  Additional Information:

## 2020-04-03 NOTE — TELEPHONE ENCOUNTER
----- Message from Aga Toscano sent at 4/3/2020 12:17 PM CDT -----  Contact: Patient   Gonzalo would like a call back at [phone], Regard to when can she drop off her Disability paperwork to be filled out.    Thanks  Td

## 2020-04-06 ENCOUNTER — TELEPHONE (OUTPATIENT)
Dept: INTERNAL MEDICINE | Facility: CLINIC | Age: 45
End: 2020-04-06

## 2020-04-06 NOTE — TELEPHONE ENCOUNTER
----- Message from Brittni Liev sent at 4/6/2020  7:59 AM CDT -----  Contact: pt  The pt request a return call, no additional info given and can be reached at [phone]///thxMW

## 2020-04-11 ENCOUNTER — TELEPHONE (OUTPATIENT)
Dept: INTERNAL MEDICINE | Facility: CLINIC | Age: 45
End: 2020-04-11

## 2020-04-11 ENCOUNTER — PATIENT MESSAGE (OUTPATIENT)
Dept: INTERNAL MEDICINE | Facility: CLINIC | Age: 45
End: 2020-04-11

## 2020-04-11 NOTE — LETTER
April 12, 2020    Gonzalo Cordova  [address] [address]             [facility]  [address] [address] [address]  Phone: [phone]  Fax: [phone] To whom it concerns:      Ms. Cordova is under my care.  She has underlying conditions which place her at increased risk for severe disease and greater risk of death if she were to be infected by the COVID-19 virus.  She must observe strict social distancing and isolation parameters to avoid infection.  Ideally she should perform any work from home.  If the isolation parameters cannot be followed at her workplace, and she cannot do her work from home, I recommend she be on leave from work until risk of infection from the novel Coronavirus is acceptably low in the community.    Please contact me with further questions.          Shara Ferrara MD

## 2020-04-11 NOTE — TELEPHONE ENCOUNTER
Left message that I need to talk to her to help fill out her disability paperwork.  Also need to determine if she still needs a letter for work.  She was scheduled for a virtual visit 04/06/2020 at which time these issues were to be completed.  She canceled that visit.  I have left a note saying that I will try calling her later today as well as check her account for any emails that she can leave over the weekend regarding when a good time to call her would be

## 2020-04-13 ENCOUNTER — PATIENT MESSAGE (OUTPATIENT)
Dept: INTERNAL MEDICINE | Facility: CLINIC | Age: 45
End: 2020-04-13

## 2020-04-13 NOTE — TELEPHONE ENCOUNTER
Note med corrections/current meds per pt report: Victoza 1.8mg, Invokana 300mg once daily, Glyburide/metformin HyD Rochloride TaB 5-500mg 2twice a day, Adderall 20mg 1 in the morning daily, but I take as needed for school or studying, Nexium 40mg 1 daily in the morning, Rosuvastatin 20mg 1 daily at night but I have not taking these yet trying to do naturally, Vitamin D2 (ergo) 50,000, Pepcid 40mg 1 at bedtime every night and 1 additional tablet as needed.

## 2020-04-16 ENCOUNTER — TELEPHONE (OUTPATIENT)
Dept: INTERNAL MEDICINE | Facility: CLINIC | Age: 45
End: 2020-04-16

## 2020-04-16 NOTE — TELEPHONE ENCOUNTER
----- Message from Nicky Preciado LPN sent at 4/15/2020  4:19 PM CDT -----  Contact: pt      ----- Message -----  From: Saumya Lee  Sent: 4/15/2020   4:11 PM CDT  To: [facility] Staff    Pt is calling to confirm that a document she dropped off at the  has been received. Please advise [phone] (home)

## 2020-04-17 ENCOUNTER — TELEPHONE (OUTPATIENT)
Dept: INTERNAL MEDICINE | Facility: CLINIC | Age: 45
End: 2020-04-17

## 2020-04-17 NOTE — LETTER
April 19, 2020    Gonzalo Cordova  [address] [address]             [facility]  [address] [address]  Phone: [phone]  Fax: [phone] To whom it concerns:        Ms. Cordova is under my care.  She has underlying conditions which place her at increased risk for severe disease and greater risk of death if she were to be infected by the COVID-19 virus.  Specifically she has uncontrolled diabetes mellitus Type 2. Her most recent A1C was 9.8 on 3/19/2020. Diabetes in itself creates immune suppression in patients, even if well controlled. She does have further immune suppression with uncontrolled sugar levels.     She must observe strict social distancing and isolation parameters to avoid infection. For this reason, she has not been able to work since 3/18/2020.    Ideally she should perform any work from home.  If the isolation parameters cannot be followed at her workplace, and she cannot do her work from home, I recommend she be on leave from work until risk of infection from the novel Coronavirus is acceptably low in the community.     Please contact me with further questions.              Shara Ferrara MD

## 2020-04-17 NOTE — TELEPHONE ENCOUNTER
----- Message from Porsha Olvera sent at 4/17/2020 10:47 AM CDT -----  ..Type:  Patient Returning Call    Who Called: pt   Who Left Message for Patient  Does the patient know what this is regarding?: letter to employer   Would the patient rather a call back or a response via [facility]? Call back   Best Call Back Number:[phone]  Additional Information: Pt is requesting a call from nurse to f/u on a fax that was suppose to sent to her job.

## 2020-04-17 NOTE — TELEPHONE ENCOUNTER
Patient asking to fax the copy of the disability form to her and the letter. Disability form will be fax to disability department. Please advise.          Patient fax # [phone].

## 2020-04-20 ENCOUNTER — PATIENT MESSAGE (OUTPATIENT)
Dept: INTERNAL MEDICINE | Facility: CLINIC | Age: 45
End: 2020-04-20

## 2020-04-20 NOTE — TELEPHONE ENCOUNTER
Have been in communication with pt over weekend - have noted her emails up to 11:56 PM when my last one was sent.

## 2020-04-20 NOTE — TELEPHONE ENCOUNTER
Forms placed at your station - please fax as marked and make a copy of all for me as well as preparing them to be sent to Epic.

## 2020-10-09 DIAGNOSIS — E11.9 TYPE 2 DIABETES MELLITUS WITHOUT COMPLICATION: ICD-10-CM

## 2020-11-11 ENCOUNTER — TELEPHONE (OUTPATIENT)
Dept: INTERNAL MEDICINE | Facility: CLINIC | Age: 45
End: 2020-11-11

## 2020-11-11 LAB
A1C EXTERNAL: 8.6
CHOL/HDLC RATIO: 4
CHOLEST SERPL-MSCNC: 254 MG/DL (ref 0–200)
CREATININE RANDOM URINE: 121 MG/DL
EOSINOPHIL NFR BLD: 109 %
ERYTHROCYTE [DISTWIDTH] IN BLOOD BY AUTOMATED COUNT: 12.4 %
HCT VFR BLD AUTO: 46 % (ref 36–46)
HDLC SERPL-MCNC: 63 MG/DL
HGB BLD-MCNC: 14.6 G/DL (ref 12–16)
LDLC SERPL CALC-MCNC: 167 MG/DL
LYMPHOCYTES NFR BLD: 3627 %
MCH RBC QN AUTO: 27.2 PG
MCHC RBC AUTO-ENTMCNC: 31.7 G/DL
MCV RBC AUTO: 85.8 FL
MICROALB/CREAT RATIO: 7
MICROALBUM.,U,RANDOM: 0.9 UG/ML
MONOCYTES NFR BLD: 601 %
MPC BLD CALC-MCNC: 12.5 FL
NEUTROPHILS NFR BLD: 3424 %
NON HDL CHOL. (LDL+VLDL): 191
PLATELETS: 212
RBC # BLD AUTO: 5.37 10*6/UL
T3FREE SP1 P CHAL SERPL-SCNC: 3.4 PG/ML
T4,THYROXINE: 7.4 UG/DL
TRIGL SERPL-MCNC: 116 MG/DL
TSH SERPL DL<=0.005 MIU/L-ACNC: 0.46 UIU/ML (ref 0.41–5.9)
WBC: 7.8

## 2020-11-12 ENCOUNTER — TELEPHONE (OUTPATIENT)
Dept: INTERNAL MEDICINE | Facility: CLINIC | Age: 45
End: 2020-11-12

## 2020-11-12 NOTE — TELEPHONE ENCOUNTER
----- Message from Lorri Ahn sent at 11/12/2020  8:50 AM CST -----  Type:  Patient Returning Call    Who Called: Michael Sánchez   Who Left Message for Patient:  Dr Ferrara nurse  Does the patient know what this is regarding?:   Would the patient rather a call back or a response via [facility]?   Call back   Best Call Back Number:  [phone]  Additional Information:  States she has a question to ask the nurse//please call//thanks/lh

## 2020-11-12 NOTE — TELEPHONE ENCOUNTER
Lab visit cancelled as per patient request. Patient states she had lab test yesterday outside facility and confirmed had A1C collected. Patient informed to call the facility to fax over the test result. Patient verbally agreed and understood.

## 2020-11-12 NOTE — TELEPHONE ENCOUNTER
----- Message from Segundo Levi MA sent at 11/11/2020  3:14 PM CST -----  Contact: [phone]/SELF  Pt called states she needs a new letter to be out of work until further notice as per her employment. Pt states she has to have letter before Friday. Please call her when letter is ready to be picked up.  ----- Message -----  From: Annette Cook  Sent: 11/11/2020   3:07 PM CST  To: [facility] Staff    Type:  Needs Medical Advice    Who Called: patient  Symptoms (please be specific):    How long has patient had these symptoms:    Pharmacy name and phone #:    Would the patient rather a call back or a response via [facility]? Call Back  Best Call Back Number:[phone]  Additional Information: Updated Job Statement to be out for work       Salty/LITO

## 2020-11-12 NOTE — TELEPHONE ENCOUNTER
Please inform pt her letter has been printed and signed. Placed at your station.   Schedule her for A1C lab test (can get it when she picks up her letter) and visit to follow - it can be virtual. Over due for DM2 recheck.

## 2020-11-19 ENCOUNTER — TELEPHONE (OUTPATIENT)
Dept: INTERNAL MEDICINE | Facility: CLINIC | Age: 45
End: 2020-11-19

## 2020-11-20 ENCOUNTER — TELEPHONE (OUTPATIENT)
Dept: INTERNAL MEDICINE | Facility: CLINIC | Age: 45
End: 2020-11-20

## 2020-11-20 NOTE — TELEPHONE ENCOUNTER
----- Message from Flavio Swartz sent at 11/20/2020  3:45 PM CST -----  Contact: Gonzalo Rodriguez is needing to schedule an appointment next week. Please call her back at [phone].      Thanks  DD

## 2020-11-20 NOTE — TELEPHONE ENCOUNTER
FMLA form recd on my desk this week - this needs to be filled out through an appt with pt. I cannot fill this out without their input/assistance.    Please schedule appt with pt. It can be virtual.    Pt was scheduled for visit Wed 11/18 but did not show apparently.   She also sts in phone message she got labs including A1C at outside lab recently - need to know where she got this done.

## 2020-11-24 ENCOUNTER — TELEPHONE (OUTPATIENT)
Dept: INTERNAL MEDICINE | Facility: CLINIC | Age: 45
End: 2020-11-24

## 2020-11-24 NOTE — TELEPHONE ENCOUNTER
Patient appointment cancelled as per patient request. Patient wanted to do one appointment for FMLA and lab in same time. Patient waiting for the result and will call to schedule an appointment when she is ready. Verbally understood.

## 2020-11-25 ENCOUNTER — TELEPHONE (OUTPATIENT)
Dept: INTERNAL MEDICINE | Facility: CLINIC | Age: 45
End: 2020-11-25

## 2020-11-25 NOTE — TELEPHONE ENCOUNTER
----- Message from Nazanin Flores sent at 11/25/2020  2:45 PM CST -----  Type:  Patient Returning Call    Who Called:patient  Who Left Message for Patient:nurse  Does the patient know what this is regarding?:na  Would the patient rather a call back or a response via [facility]?  Call back  Best Call Back [phone]  Additional Information:na

## 2020-11-27 ENCOUNTER — PATIENT MESSAGE (OUTPATIENT)
Dept: INTERNAL MEDICINE | Facility: CLINIC | Age: 45
End: 2020-11-27

## 2020-11-27 ENCOUNTER — TELEPHONE (OUTPATIENT)
Dept: INTERNAL MEDICINE | Facility: CLINIC | Age: 45
End: 2020-11-27

## 2020-11-27 NOTE — TELEPHONE ENCOUNTER
Patient was scheduled for virtual visit today at 7:30 a.m..  It is now 7:48 a.m..  Staff has tried to call her.  I tried to call her.  I left a message that she needs to have a visit for me to fill out her FMLA form and I am sorry she was unable to be seen virtually today but look forward to visit as soon as possible

## 2020-11-30 ENCOUNTER — OFFICE VISIT (OUTPATIENT)
Dept: INTERNAL MEDICINE | Facility: CLINIC | Age: 45
End: 2020-11-30
Payer: COMMERCIAL

## 2020-11-30 DIAGNOSIS — E11.65 UNCONTROLLED TYPE 2 DIABETES MELLITUS WITH HYPERGLYCEMIA: Primary | ICD-10-CM

## 2020-11-30 PROCEDURE — 99214 OFFICE O/P EST MOD 30 MIN: CPT | Mod: 95,,, | Performed by: FAMILY MEDICINE

## 2020-11-30 PROCEDURE — 99214 PR OFFICE/OUTPT VISIT, EST, LEVL IV, 30-39 MIN: ICD-10-PCS | Mod: 95,,, | Performed by: FAMILY MEDICINE

## 2020-11-30 RX ORDER — DEXTROAMPHETAMINE SACCHARATE, AMPHETAMINE ASPARTATE, DEXTROAMPHETAMINE SULFATE AND AMPHETAMINE SULFATE 5; 5; 5; 5 MG/1; MG/1; MG/1; MG/1
1 TABLET ORAL EVERY MORNING
COMMUNITY
Start: 2020-10-10 | End: 2021-01-25 | Stop reason: SDUPTHER

## 2020-11-30 RX ORDER — ESOMEPRAZOLE MAGNESIUM 40 MG/1
40 CAPSULE, DELAYED RELEASE ORAL DAILY
COMMUNITY
Start: 2020-09-26 | End: 2021-04-05

## 2020-11-30 RX ORDER — FAMOTIDINE 40 MG/1
40 TABLET, FILM COATED ORAL DAILY PRN
COMMUNITY
Start: 2020-10-08

## 2020-11-30 RX ORDER — ERGOCALCIFEROL 1.25 MG/1
CAPSULE ORAL
COMMUNITY
Start: 2020-10-08 | End: 2021-04-05 | Stop reason: SDUPTHER

## 2020-11-30 NOTE — PROGRESS NOTES
Subjective:       Patient ID: Gonzalo Cordova is a 44 y.o. female.    Chief Complaint: fmla    The patient location is: home /[place]  The chief complaint leading to consultation is: DM2 recheck, FMLA forms completion  Visit type: audiovisual  Total time spent with patient: 30 min  Each patient to whom he or she provides medical services by telemedicine is:  (1) informed of the relationship between the physician and patient and the respective role of any other health care provider with respect to management of the patient; and (2) notified that he or she may decline to receive medical services by telemedicine and may withdraw from such care at any time.    Notes: here with recent labs from [facility] Also needs her FMLA form completed. She is a  for [employer]. Has not been working since onset Pandemic 2/2 increased risk severe illness if contracts COVID. 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 Significantly, the enclosed space and exposure to many people over course of day are sig risk.    Patient with type 2 diabetes here for scheduled recheck with recent labs.     Health Maintenance Due:    Hepatitis C Screening due on 1975  Eye Exam due on 12/30/1986  TETANUS VACCINE due on 12/30/1994  Pneumococcal Vaccine (Medium Risk)(1 of 1 - PPSV23) due on 12/30/1995  Low Dose Statin due on 12/30/1997  Influenza Vaccine(1) due on 08/01/2020  Hemoglobin A1c due on 09/19/2020  Mammogram due on 12/04/2020  Foot Exam due on 02/03/2021      Prior Lab Results:       Component                Value               Date                       WBC                      6.90                03/19/2020                 HGB                      14.1                03/19/2020                 HCT                      44.9                03/19/2020                 PLT                      229                 03/19/2020                 CHOL                     198                 03/19/2020                 TRIG                      93                  03/19/2020                 HDL                      50                  03/19/2020                 LDLCALC                  129.4               03/19/2020                 ALT                      12                  03/19/2020                 AST                      10                  03/19/2020                 NA                       140                 03/19/2020                 K                        3.8                 03/19/2020                 CL                       103                 03/19/2020                 CALCIUM                  9.3                 03/19/2020                 CREATININE               0.7                 03/19/2020                 BUN                      6                   03/19/2020                 CO2                      28                  03/19/2020                 TSH                      0.492               03/19/2020                 GLU                      209 (H)             03/19/2020                 ESTGFRAFRICA             >60.0               03/19/2020                 EGFRNONAA                >60.0               03/19/2020                 HGBA1C                   9.8 (H)             03/19/2020                 MICALBCREAT              6.8                 03/19/2020 11/11/2020: 7 ([facility])          Lab Results       Component                Value               Date       HGBA1C                   8.6  (H)            11/11/2020 ([facility])           HGBA1C                   9.8 (H)             03/19/2020            Diabetes Medications        glyBURIDE-metformin 5-500 mg (GLUCOVANCE) 5-500 mg Tab Take 2 tablets by mouth 2 (two) times daily with meals.    INVOKANA 300 mg Tab tablet Take 1 tablet (300 mg total) by mouth once daily.    liraglutide 0.6 mg/0.1 mL, 18 mg/3 mL, subq PNIJ (VICTOZA 2-MARIA ISABEL) 0.6 mg/0.1 mL (18 mg/3 mL) PnIj Inject 1.2 mg into the skin once daily. This   is per pt report only - verify dose. NO PRINT entry        BP  Readings from Last 3 Encounters:  02/03/20 : 133/85  04/03/19 : 134/78  12/16/15 : 124/72  On no BP meds - no BP today - virtual visit.      Lab Results - not currently on statin - stress results found on 11/11/2020 outside labs ([facility]) with total cholesterol 254, triglycerides 116, HDL 63, and LDL cholesterol 167.       Component                Value               Date                       CHOL                     198                 03/19/2020            Lab Results       Component                Value               Date                       HDL                      50                  03/19/2020            Lab Results       Component                Value               Date                       LDLCALC                  129.4               03/19/2020            Lab Results       Component                Value               Date                       TRIG                     93                  03/19/2020                Diabetes  She presents for her follow-up diabetic visit. She has type 2 diabetes mellitus. No MedicAlert identification noted. The initial diagnosis of diabetes was made 2008 years ago. Hypoglycemia symptoms include confusion, headaches, mood changes, nervousness/anxiousness and sleepiness. Pertinent negatives for hypoglycemia include no dizziness, hunger, pallor, seizures, speech difficulty, sweats or tremors. Associated symptoms include blurred vision, fatigue, foot paresthesias, visual change, weakness and weight loss. Pertinent negatives for diabetes include no chest pain, no foot ulcerations, no polydipsia, no polyphagia and no polyuria. Pertinent negatives for hypoglycemia complications include no blackouts, no hospitalization, no nocturnal hypoglycemia and no required assistance. Symptoms are improving. Pertinent negatives for diabetic complications include no CVA, heart disease, impotence, nephropathy, peripheral neuropathy, PVD or retinopathy. Risk factors for coronary artery disease  include dyslipidemia, sedentary lifestyle, stress and tobacco exposure. Current diabetic treatment includes oral agent (triple therapy). She is compliant with treatment some of the time. Her weight is stable. She is following a generally unhealthy diet. When asked about meal planning, she reported none. She has not had a previous visit with a dietitian. She rarely participates in exercise. She monitors blood glucose at home 1-2 x per week. Blood glucose monitoring compliance is inadequate. Her home blood glucose trend is fluctuating minimally. She sees a podiatrist.Eye exam is current.     Review of Systems   Constitutional: Positive for fatigue and weight loss.   Eyes: Positive for blurred vision.   Cardiovascular: Negative for chest pain.   Endocrine: Negative for polydipsia, polyphagia and polyuria.   Genitourinary: Negative for impotence.   Skin: Negative for pallor.   Neurological: Positive for weakness and headaches. Negative for dizziness, tremors, seizures and speech difficulty.   Psychiatric/Behavioral: Positive for confusion. The patient is nervous/anxious.        Objective:      Physical Exam  Constitutional:       Appearance: Normal appearance.   Pulmonary:      Effort: Pulmonary effort is normal. No respiratory distress.   Neurological:      Mental Status: She is alert and oriented to person, place, and time.   Psychiatric:         Mood and Affect: Mood normal.         Behavior: Behavior normal.           Assessment/Plan:     1. Uncontrolled type 2 diabetes mellitus with hyperglycemia  Ambulatory referral/consult to Diabetes Education    11/11/2020 labs show improvement in A1C from 9.8 to 8.6- still uncontrolled.  Needs recheck with BS readings. 2 weeks.  Addendum: pt was no show/unavailable (virtual visit - did not answer phone at time of visit) for her 2 weeks recheck.  FMLA from addressed.  More than 50% of visit was spent in counseling regarding options, recommendations, medication use, side  effects, expectations, and precautions.  Total time spent face-to-face was 25 minutes.

## 2020-12-01 ENCOUNTER — TELEPHONE (OUTPATIENT)
Dept: INTERNAL MEDICINE | Facility: CLINIC | Age: 45
End: 2020-12-01

## 2020-12-01 NOTE — TELEPHONE ENCOUNTER
----- Message from Pili Olvera sent at 12/1/2020  2:37 PM CST -----  Regarding: pt  Pt would like a call from nurse in regards to her appt. Please call back at [phone].      Thank You ,   Pili Olvera

## 2020-12-01 NOTE — TELEPHONE ENCOUNTER
----- Message from Pili Olvera sent at 12/1/2020 11:10 AM CST -----  Regarding: pt  Pt would like a call from nurse in regards to her fmla paperwork . Please call back at .[phone] (home)         Thank You ,   Pili Olvera

## 2020-12-02 ENCOUNTER — TELEPHONE (OUTPATIENT)
Dept: INTERNAL MEDICINE | Facility: CLINIC | Age: 45
End: 2020-12-02

## 2020-12-02 NOTE — TELEPHONE ENCOUNTER
----- Message from Elton Dixon sent at 12/1/2020  4:08 PM CST -----  Regarding: Return Call  Contact: Patient  Type:  Patient Returning Call    Who Called:  Patient    Who Left Message for Patient:  Gracy    Does the patient know what this is regarding?: Yes    Would the patient rather a call back or a response via [facility]? Callback    Best Call Back Number: [phone] ([place])     Additional Information:

## 2020-12-03 ENCOUNTER — PATIENT MESSAGE (OUTPATIENT)
Dept: INTERNAL MEDICINE | Facility: CLINIC | Age: 45
End: 2020-12-03

## 2020-12-04 ENCOUNTER — TELEPHONE (OUTPATIENT)
Dept: INTERNAL MEDICINE | Facility: CLINIC | Age: 45
End: 2020-12-04

## 2020-12-04 NOTE — TELEPHONE ENCOUNTER
----- Message from Pili Olvera sent at 12/4/2020 11:38 AM CST -----  Regarding: pt  Would like a call from nurse in regards to picking up her FMLA paperwork today . Please call back at .[phone] (home)       Thank You ,   erythromycin

## 2020-12-07 PROBLEM — K21.9 GERD (GASTROESOPHAGEAL REFLUX DISEASE): Status: ACTIVE | Noted: 2020-12-07

## 2020-12-11 ENCOUNTER — TELEPHONE (OUTPATIENT)
Dept: INTERNAL MEDICINE | Facility: CLINIC | Age: 45
End: 2020-12-11

## 2020-12-11 NOTE — TELEPHONE ENCOUNTER
----- Message from Allie Ann sent at 12/11/2020 12:02 PM CST -----  Contact: pt  States she's calling rg following up on her FMLA paperwork / her employer or herself hasn't received it and can be reached at [phone]/ [facility] to call back asap //[email or web address]

## 2020-12-16 DIAGNOSIS — Z12.31 OTHER SCREENING MAMMOGRAM: ICD-10-CM

## 2020-12-21 ENCOUNTER — PATIENT OUTREACH (OUTPATIENT)
Dept: ADMINISTRATIVE | Facility: HOSPITAL | Age: 45
End: 2020-12-21

## 2021-01-08 DIAGNOSIS — E11.65 UNCONTROLLED TYPE 2 DIABETES MELLITUS WITH HYPERGLYCEMIA: Primary | ICD-10-CM

## 2021-01-11 ENCOUNTER — PATIENT MESSAGE (OUTPATIENT)
Dept: INTERNAL MEDICINE | Facility: CLINIC | Age: 46
End: 2021-01-11

## 2021-01-15 ENCOUNTER — TELEPHONE (OUTPATIENT)
Dept: INTERNAL MEDICINE | Facility: CLINIC | Age: 46
End: 2021-01-15

## 2021-01-19 ENCOUNTER — OFFICE VISIT (OUTPATIENT)
Dept: INTERNAL MEDICINE | Facility: CLINIC | Age: 46
End: 2021-01-19
Payer: COMMERCIAL

## 2021-01-19 ENCOUNTER — TELEPHONE (OUTPATIENT)
Dept: INTERNAL MEDICINE | Facility: CLINIC | Age: 46
End: 2021-01-19

## 2021-01-19 DIAGNOSIS — F43.0 STRESS REACTION: ICD-10-CM

## 2021-01-19 DIAGNOSIS — E78.5 HYPERLIPIDEMIA ASSOCIATED WITH TYPE 2 DIABETES MELLITUS: ICD-10-CM

## 2021-01-19 DIAGNOSIS — E11.65 UNCONTROLLED TYPE 2 DIABETES MELLITUS WITH HYPERGLYCEMIA: Primary | ICD-10-CM

## 2021-01-19 DIAGNOSIS — M79.673 PAIN OF FOOT, UNSPECIFIED LATERALITY: ICD-10-CM

## 2021-01-19 DIAGNOSIS — E11.69 HYPERLIPIDEMIA ASSOCIATED WITH TYPE 2 DIABETES MELLITUS: ICD-10-CM

## 2021-01-19 PROCEDURE — 99214 OFFICE O/P EST MOD 30 MIN: CPT | Mod: 95,,, | Performed by: FAMILY MEDICINE

## 2021-01-19 PROCEDURE — 99214 PR OFFICE/OUTPT VISIT, EST, LEVL IV, 30-39 MIN: ICD-10-PCS | Mod: 95,,, | Performed by: FAMILY MEDICINE

## 2021-01-20 ENCOUNTER — TELEPHONE (OUTPATIENT)
Dept: INTERNAL MEDICINE | Facility: CLINIC | Age: 46
End: 2021-01-20

## 2021-01-21 ENCOUNTER — PATIENT MESSAGE (OUTPATIENT)
Dept: INTERNAL MEDICINE | Facility: CLINIC | Age: 46
End: 2021-01-21

## 2021-01-21 ENCOUNTER — TELEPHONE (OUTPATIENT)
Dept: INTERNAL MEDICINE | Facility: CLINIC | Age: 46
End: 2021-01-21

## 2021-01-25 ENCOUNTER — OFFICE VISIT (OUTPATIENT)
Dept: PODIATRY | Facility: CLINIC | Age: 46
End: 2021-01-25
Payer: COMMERCIAL

## 2021-01-25 ENCOUNTER — PATIENT OUTREACH (OUTPATIENT)
Dept: ADMINISTRATIVE | Facility: OTHER | Age: 46
End: 2021-01-25

## 2021-01-25 VITALS
WEIGHT: 123.69 LBS | HEIGHT: 67 IN | SYSTOLIC BLOOD PRESSURE: 116 MMHG | HEART RATE: 80 BPM | DIASTOLIC BLOOD PRESSURE: 72 MMHG | BODY MASS INDEX: 19.41 KG/M2

## 2021-01-25 DIAGNOSIS — E11.9 COMPREHENSIVE DIABETIC FOOT EXAMINATION, TYPE 2 DM, ENCOUNTER FOR: Primary | ICD-10-CM

## 2021-01-25 DIAGNOSIS — E11.9 CONTROLLED TYPE 2 DIABETES MELLITUS WITHOUT COMPLICATION, WITHOUT LONG-TERM CURRENT USE OF INSULIN: ICD-10-CM

## 2021-01-25 DIAGNOSIS — M79.673 PAIN OF FOOT, UNSPECIFIED LATERALITY: ICD-10-CM

## 2021-01-25 PROCEDURE — 99999 PR PBB SHADOW E&M-EST. PATIENT-LVL III: CPT | Mod: PBBFAC,,, | Performed by: PODIATRIST

## 2021-01-25 PROCEDURE — 99999 PR PBB SHADOW E&M-EST. PATIENT-LVL III: ICD-10-PCS | Mod: PBBFAC,,, | Performed by: PODIATRIST

## 2021-01-25 PROCEDURE — 99203 OFFICE O/P NEW LOW 30 MIN: CPT | Mod: S$GLB,,, | Performed by: PODIATRIST

## 2021-01-25 PROCEDURE — 99203 PR OFFICE/OUTPT VISIT, NEW, LEVL III, 30-44 MIN: ICD-10-PCS | Mod: S$GLB,,, | Performed by: PODIATRIST

## 2021-01-25 RX ORDER — DEXTROAMPHETAMINE SACCHARATE, AMPHETAMINE ASPARTATE, DEXTROAMPHETAMINE SULFATE AND AMPHETAMINE SULFATE 2.5; 2.5; 2.5; 2.5 MG/1; MG/1; MG/1; MG/1
TABLET ORAL
COMMUNITY
End: 2021-04-05

## 2021-01-25 RX ORDER — METFORMIN HYDROCHLORIDE 1000 MG/1
TABLET ORAL
COMMUNITY
End: 2021-04-05

## 2021-02-01 ENCOUNTER — PATIENT OUTREACH (OUTPATIENT)
Dept: INTERNAL MEDICINE | Facility: CLINIC | Age: 46
End: 2021-02-01

## 2021-02-01 DIAGNOSIS — E11.65 UNCONTROLLED TYPE 2 DIABETES MELLITUS WITH HYPERGLYCEMIA: Primary | ICD-10-CM

## 2021-02-02 ENCOUNTER — HOSPITAL ENCOUNTER (OUTPATIENT)
Dept: RADIOLOGY | Facility: HOSPITAL | Age: 46
Discharge: HOME | End: 2021-02-02
Attending: FAMILY MEDICINE
Payer: COMMERCIAL

## 2021-02-02 ENCOUNTER — PATIENT MESSAGE (OUTPATIENT)
Dept: INTERNAL MEDICINE | Facility: CLINIC | Age: 46
End: 2021-02-02

## 2021-02-02 ENCOUNTER — OFFICE VISIT (OUTPATIENT)
Dept: OPHTHALMOLOGY | Facility: CLINIC | Age: 46
End: 2021-02-02
Payer: COMMERCIAL

## 2021-02-02 ENCOUNTER — TELEPHONE (OUTPATIENT)
Dept: INTERNAL MEDICINE | Facility: CLINIC | Age: 46
End: 2021-02-02

## 2021-02-02 ENCOUNTER — TELEPHONE (OUTPATIENT)
Dept: ORTHOPEDICS | Facility: CLINIC | Age: 46
End: 2021-02-02

## 2021-02-02 VITALS — WEIGHT: 123.69 LBS | HEIGHT: 67 IN | BODY MASS INDEX: 19.41 KG/M2

## 2021-02-02 DIAGNOSIS — E11.65 UNCONTROLLED TYPE 2 DIABETES MELLITUS WITH HYPERGLYCEMIA: ICD-10-CM

## 2021-02-02 DIAGNOSIS — H52.7 REFRACTIVE ERRORS: ICD-10-CM

## 2021-02-02 DIAGNOSIS — Z46.0 ENCOUNTER FOR FITTING OR ADJUSTMENT OF SPECTACLES OR CONTACT LENSES: ICD-10-CM

## 2021-02-02 DIAGNOSIS — Z12.31 OTHER SCREENING MAMMOGRAM: ICD-10-CM

## 2021-02-02 DIAGNOSIS — E11.9 DIABETES MELLITUS TYPE 2 WITHOUT RETINOPATHY: Primary | ICD-10-CM

## 2021-02-02 PROCEDURE — 77063 BREAST TOMOSYNTHESIS BI: CPT | Mod: 26,,, | Performed by: RADIOLOGY

## 2021-02-02 PROCEDURE — 77067 SCR MAMMO BI INCL CAD: CPT | Mod: TC

## 2021-02-02 PROCEDURE — 77067 SCR MAMMO BI INCL CAD: CPT | Mod: 26,,, | Performed by: RADIOLOGY

## 2021-02-02 PROCEDURE — 92004 COMPRE OPH EXAM NEW PT 1/>: CPT | Mod: S$GLB,,, | Performed by: OPTOMETRIST

## 2021-02-02 PROCEDURE — 92015 DETERMINE REFRACTIVE STATE: CPT | Mod: S$GLB,,, | Performed by: OPTOMETRIST

## 2021-02-02 PROCEDURE — 99999 PR PBB SHADOW E&M-EST. PATIENT-LVL II: CPT | Mod: PBBFAC,,, | Performed by: OPTOMETRIST

## 2021-02-02 PROCEDURE — 92310 CONTACT LENS FITTING OU: CPT | Mod: CSM,,, | Performed by: OPTOMETRIST

## 2021-02-02 PROCEDURE — 92310 PR CONTACT LENS FITTING (NO CHANGE): ICD-10-PCS | Mod: CSM,,, | Performed by: OPTOMETRIST

## 2021-02-02 PROCEDURE — 99999 PR PBB SHADOW E&M-EST. PATIENT-LVL II: ICD-10-PCS | Mod: PBBFAC,,, | Performed by: OPTOMETRIST

## 2021-02-02 PROCEDURE — 77063 MAMMO DIGITAL SCREENING BILAT WITH TOMO: ICD-10-PCS | Mod: 26,,, | Performed by: RADIOLOGY

## 2021-02-02 PROCEDURE — 77067 MAMMO DIGITAL SCREENING BILAT WITH TOMO: ICD-10-PCS | Mod: 26,,, | Performed by: RADIOLOGY

## 2021-02-02 PROCEDURE — 92015 PR REFRACTION: ICD-10-PCS | Mod: S$GLB,,, | Performed by: OPTOMETRIST

## 2021-02-02 PROCEDURE — 92004 PR EYE EXAM, NEW PATIENT,COMPREHESV: ICD-10-PCS | Mod: S$GLB,,, | Performed by: OPTOMETRIST

## 2021-02-05 DIAGNOSIS — Z91.89 INCREASED RISK OF BREAST CANCER: Primary | ICD-10-CM

## 2021-02-08 ENCOUNTER — TELEPHONE (OUTPATIENT)
Dept: INTERNAL MEDICINE | Facility: CLINIC | Age: 46
End: 2021-02-08

## 2021-02-08 ENCOUNTER — TELEPHONE (OUTPATIENT)
Dept: HEMATOLOGY/ONCOLOGY | Facility: CLINIC | Age: 46
End: 2021-02-08

## 2021-02-09 ENCOUNTER — PATIENT MESSAGE (OUTPATIENT)
Dept: INTERNAL MEDICINE | Facility: CLINIC | Age: 46
End: 2021-02-09

## 2021-02-09 ENCOUNTER — TELEPHONE (OUTPATIENT)
Dept: INTERNAL MEDICINE | Facility: CLINIC | Age: 46
End: 2021-02-09

## 2021-03-12 ENCOUNTER — PATIENT OUTREACH (OUTPATIENT)
Dept: ADMINISTRATIVE | Facility: OTHER | Age: 46
End: 2021-03-12

## 2021-03-16 ENCOUNTER — TELEPHONE (OUTPATIENT)
Dept: HEMATOLOGY/ONCOLOGY | Facility: CLINIC | Age: 46
End: 2021-03-16

## 2021-03-18 ENCOUNTER — TELEPHONE (OUTPATIENT)
Dept: INTERNAL MEDICINE | Facility: CLINIC | Age: 46
End: 2021-03-18

## 2021-03-18 DIAGNOSIS — R68.83 CHILLS: Primary | ICD-10-CM

## 2021-03-18 DIAGNOSIS — R51.9 ACUTE INTRACTABLE HEADACHE, UNSPECIFIED HEADACHE TYPE: ICD-10-CM

## 2021-03-18 DIAGNOSIS — Z20.822 CLOSE EXPOSURE TO COVID-19 VIRUS: ICD-10-CM

## 2021-03-24 DIAGNOSIS — M54.2 NECK PAIN: ICD-10-CM

## 2021-03-24 DIAGNOSIS — M25.511 RIGHT SHOULDER PAIN, UNSPECIFIED CHRONICITY: Primary | ICD-10-CM

## 2021-03-26 ENCOUNTER — HOSPITAL ENCOUNTER (OUTPATIENT)
Dept: RADIOLOGY | Facility: HOSPITAL | Age: 46
Discharge: HOME | End: 2021-03-26
Attending: PHYSICIAN ASSISTANT
Payer: COMMERCIAL

## 2021-03-26 ENCOUNTER — OFFICE VISIT (OUTPATIENT)
Dept: ORTHOPEDICS | Facility: CLINIC | Age: 46
End: 2021-03-26
Payer: COMMERCIAL

## 2021-03-26 VITALS
WEIGHT: 123 LBS | BODY MASS INDEX: 19.3 KG/M2 | DIASTOLIC BLOOD PRESSURE: 79 MMHG | SYSTOLIC BLOOD PRESSURE: 127 MMHG | HEIGHT: 67 IN | HEART RATE: 82 BPM

## 2021-03-26 DIAGNOSIS — M79.18 MYOFASCIAL PAIN ON RIGHT SIDE: ICD-10-CM

## 2021-03-26 DIAGNOSIS — M54.2 NECK PAIN: ICD-10-CM

## 2021-03-26 DIAGNOSIS — M25.511 RIGHT SHOULDER PAIN, UNSPECIFIED CHRONICITY: ICD-10-CM

## 2021-03-26 DIAGNOSIS — M79.601 PAIN OF RIGHT UPPER EXTREMITY: ICD-10-CM

## 2021-03-26 DIAGNOSIS — G89.29 CHRONIC RIGHT SHOULDER PAIN: Primary | ICD-10-CM

## 2021-03-26 DIAGNOSIS — M25.511 CHRONIC RIGHT SHOULDER PAIN: Primary | ICD-10-CM

## 2021-03-26 DIAGNOSIS — M54.2 CERVICALGIA: ICD-10-CM

## 2021-03-26 DIAGNOSIS — M75.41 IMPINGEMENT SYNDROME OF RIGHT SHOULDER: ICD-10-CM

## 2021-03-26 PROCEDURE — 72040 X-RAY EXAM NECK SPINE 2-3 VW: CPT | Mod: TC

## 2021-03-26 PROCEDURE — 99204 PR OFFICE/OUTPT VISIT, NEW, LEVL IV, 45-59 MIN: ICD-10-PCS | Mod: S$GLB,,, | Performed by: PHYSICIAN ASSISTANT

## 2021-03-26 PROCEDURE — 99999 PR PBB SHADOW E&M-EST. PATIENT-LVL IV: CPT | Mod: PBBFAC,,, | Performed by: PHYSICIAN ASSISTANT

## 2021-03-26 PROCEDURE — 73030 X-RAY EXAM OF SHOULDER: CPT | Mod: 26,RT,, | Performed by: RADIOLOGY

## 2021-03-26 PROCEDURE — 72040 XR CERVICAL SPINE AP LATERAL: ICD-10-PCS | Mod: 26,,, | Performed by: RADIOLOGY

## 2021-03-26 PROCEDURE — 99999 PR PBB SHADOW E&M-EST. PATIENT-LVL IV: ICD-10-PCS | Mod: PBBFAC,,, | Performed by: PHYSICIAN ASSISTANT

## 2021-03-26 PROCEDURE — 99204 OFFICE O/P NEW MOD 45 MIN: CPT | Mod: S$GLB,,, | Performed by: PHYSICIAN ASSISTANT

## 2021-03-26 PROCEDURE — 73030 X-RAY EXAM OF SHOULDER: CPT | Mod: TC,RT

## 2021-03-26 PROCEDURE — 73030 XR SHOULDER COMPLETE 2 OR MORE VIEWS RIGHT: ICD-10-PCS | Mod: 26,RT,, | Performed by: RADIOLOGY

## 2021-03-26 PROCEDURE — 72040 X-RAY EXAM NECK SPINE 2-3 VW: CPT | Mod: 26,,, | Performed by: RADIOLOGY

## 2021-03-26 RX ORDER — CYCLOBENZAPRINE HCL 5 MG
5 TABLET ORAL NIGHTLY PRN
Qty: 30 TABLET | Refills: 1 | Status: SHIPPED | OUTPATIENT
Start: 2021-03-26 | End: 2021-10-22

## 2021-03-26 RX ORDER — NAPROXEN 500 MG/1
500 TABLET ORAL 2 TIMES DAILY PRN
Qty: 60 TABLET | Refills: 1 | Status: SHIPPED | OUTPATIENT
Start: 2021-03-26 | End: 2021-10-22

## 2021-03-29 ENCOUNTER — NURSE TRIAGE (OUTPATIENT)
Dept: ADMINISTRATIVE | Facility: CLINIC | Age: 46
End: 2021-03-29

## 2021-03-29 ENCOUNTER — TELEPHONE (OUTPATIENT)
Dept: INTERNAL MEDICINE | Facility: CLINIC | Age: 46
End: 2021-03-29

## 2021-04-05 ENCOUNTER — OFFICE VISIT (OUTPATIENT)
Dept: INTERNAL MEDICINE | Facility: CLINIC | Age: 46
End: 2021-04-05
Payer: COMMERCIAL

## 2021-04-05 ENCOUNTER — LAB VISIT (OUTPATIENT)
Dept: LAB | Facility: HOSPITAL | Age: 46
End: 2021-04-05
Attending: FAMILY MEDICINE
Payer: COMMERCIAL

## 2021-04-05 VITALS
OXYGEN SATURATION: 98 % | WEIGHT: 128.06 LBS | RESPIRATION RATE: 18 BRPM | BODY MASS INDEX: 20.58 KG/M2 | TEMPERATURE: 98 F | DIASTOLIC BLOOD PRESSURE: 64 MMHG | HEIGHT: 66 IN | SYSTOLIC BLOOD PRESSURE: 114 MMHG | HEART RATE: 86 BPM

## 2021-04-05 DIAGNOSIS — E11.65 UNCONTROLLED TYPE 2 DIABETES MELLITUS WITH HYPERGLYCEMIA: ICD-10-CM

## 2021-04-05 DIAGNOSIS — R63.4 WEIGHT DECREASE: ICD-10-CM

## 2021-04-05 DIAGNOSIS — Z11.59 ENCOUNTER FOR HEPATITIS C SCREENING TEST FOR LOW RISK PATIENT: ICD-10-CM

## 2021-04-05 DIAGNOSIS — Z76.89 ESTABLISHING CARE WITH NEW DOCTOR, ENCOUNTER FOR: ICD-10-CM

## 2021-04-05 DIAGNOSIS — F90.9 ATTENTION DEFICIT HYPERACTIVITY DISORDER (ADHD), UNSPECIFIED ADHD TYPE: ICD-10-CM

## 2021-04-05 DIAGNOSIS — R63.4 WEIGHT DECREASE: Primary | ICD-10-CM

## 2021-04-05 PROCEDURE — 84439 ASSAY OF FREE THYROXINE: CPT | Performed by: FAMILY MEDICINE

## 2021-04-05 PROCEDURE — 84481 FREE ASSAY (FT-3): CPT | Performed by: FAMILY MEDICINE

## 2021-04-05 PROCEDURE — 84443 ASSAY THYROID STIM HORMONE: CPT | Performed by: FAMILY MEDICINE

## 2021-04-05 PROCEDURE — 99214 OFFICE O/P EST MOD 30 MIN: CPT | Mod: S$GLB,,, | Performed by: FAMILY MEDICINE

## 2021-04-05 PROCEDURE — 99214 PR OFFICE/OUTPT VISIT, EST, LEVL IV, 30-39 MIN: ICD-10-PCS | Mod: S$GLB,,, | Performed by: FAMILY MEDICINE

## 2021-04-05 PROCEDURE — 83690 ASSAY OF LIPASE: CPT | Performed by: FAMILY MEDICINE

## 2021-04-05 PROCEDURE — 99999 PR PBB SHADOW E&M-EST. PATIENT-LVL IV: ICD-10-PCS | Mod: PBBFAC,,, | Performed by: FAMILY MEDICINE

## 2021-04-05 PROCEDURE — 86803 HEPATITIS C AB TEST: CPT | Performed by: FAMILY MEDICINE

## 2021-04-05 PROCEDURE — 82150 ASSAY OF AMYLASE: CPT | Performed by: FAMILY MEDICINE

## 2021-04-05 PROCEDURE — 99999 PR PBB SHADOW E&M-EST. PATIENT-LVL IV: CPT | Mod: PBBFAC,,, | Performed by: FAMILY MEDICINE

## 2021-04-05 PROCEDURE — 36415 COLL VENOUS BLD VENIPUNCTURE: CPT | Performed by: FAMILY MEDICINE

## 2021-04-05 RX ORDER — GLYBURIDE-METFORMIN HYDROCHLORIDE 5; 500 MG/1; MG/1
2 TABLET ORAL 2 TIMES DAILY WITH MEALS
Qty: 180 TABLET | Refills: 0 | Status: SHIPPED | OUTPATIENT
Start: 2021-04-05 | End: 2021-04-21 | Stop reason: SDUPTHER

## 2021-04-05 RX ORDER — PANTOPRAZOLE SODIUM 40 MG/1
40 TABLET, DELAYED RELEASE ORAL DAILY
Qty: 90 TABLET | Refills: 0 | Status: SHIPPED | OUTPATIENT
Start: 2021-04-05 | End: 2021-10-22 | Stop reason: SDUPTHER

## 2021-04-05 RX ORDER — ERGOCALCIFEROL 1.25 MG/1
50000 CAPSULE ORAL
Qty: 12 CAPSULE | Refills: 2 | Status: SHIPPED | OUTPATIENT
Start: 2021-04-05

## 2021-04-05 RX ORDER — DEXTROAMPHETAMINE SACCHARATE, AMPHETAMINE ASPARTATE, DEXTROAMPHETAMINE SULFATE AND AMPHETAMINE SULFATE 5; 5; 5; 5 MG/1; MG/1; MG/1; MG/1
1 TABLET ORAL DAILY
Qty: 30 TABLET | Refills: 0 | Status: SHIPPED | OUTPATIENT
Start: 2021-04-05 | End: 2021-10-22 | Stop reason: SDUPTHER

## 2021-04-05 RX ORDER — CANAGLIFLOZIN 300 MG/1
300 TABLET, FILM COATED ORAL DAILY
Qty: 90 TABLET | Refills: 0 | Status: SHIPPED | OUTPATIENT
Start: 2021-04-05 | End: 2021-09-03 | Stop reason: SDUPTHER

## 2021-04-06 LAB
AMYLASE SERPL-CCNC: 70 U/L (ref 20–110)
HCV AB SERPL QL IA: NEGATIVE
LIPASE SERPL-CCNC: 18 U/L (ref 4–60)
T3FREE SERPL-MCNC: 2.7 PG/ML (ref 2.3–4.2)
T4 FREE SERPL-MCNC: 0.95 NG/DL (ref 0.71–1.51)
TSH SERPL DL<=0.005 MIU/L-ACNC: 0.32 UIU/ML (ref 0.4–4)

## 2021-04-07 ENCOUNTER — PATIENT MESSAGE (OUTPATIENT)
Dept: INTERNAL MEDICINE | Facility: CLINIC | Age: 46
End: 2021-04-07

## 2021-04-13 ENCOUNTER — PATIENT MESSAGE (OUTPATIENT)
Dept: INTERNAL MEDICINE | Facility: CLINIC | Age: 46
End: 2021-04-13

## 2021-04-13 ENCOUNTER — TELEPHONE (OUTPATIENT)
Dept: ORTHOPEDICS | Facility: CLINIC | Age: 46
End: 2021-04-13

## 2021-04-20 ENCOUNTER — TELEPHONE (OUTPATIENT)
Dept: INTERNAL MEDICINE | Facility: CLINIC | Age: 46
End: 2021-04-20

## 2021-04-21 DIAGNOSIS — E11.65 UNCONTROLLED TYPE 2 DIABETES MELLITUS WITH HYPERGLYCEMIA: ICD-10-CM

## 2021-04-22 ENCOUNTER — PATIENT OUTREACH (OUTPATIENT)
Dept: ADMINISTRATIVE | Facility: OTHER | Age: 46
End: 2021-04-22

## 2021-04-22 RX ORDER — GLYBURIDE-METFORMIN HYDROCHLORIDE 5; 500 MG/1; MG/1
2 TABLET ORAL 2 TIMES DAILY WITH MEALS
Qty: 360 TABLET | Refills: 1 | Status: SHIPPED | OUTPATIENT
Start: 2021-04-22 | End: 2021-10-22 | Stop reason: SDUPTHER

## 2021-04-23 ENCOUNTER — TELEPHONE (OUTPATIENT)
Dept: INTERNAL MEDICINE | Facility: CLINIC | Age: 46
End: 2021-04-23

## 2021-04-30 ENCOUNTER — PATIENT MESSAGE (OUTPATIENT)
Dept: HEMATOLOGY/ONCOLOGY | Facility: CLINIC | Age: 46
End: 2021-04-30

## 2021-05-03 ENCOUNTER — TELEPHONE (OUTPATIENT)
Dept: ORTHOPEDICS | Facility: CLINIC | Age: 46
End: 2021-05-03

## 2021-05-04 ENCOUNTER — TELEPHONE (OUTPATIENT)
Dept: INTERNAL MEDICINE | Facility: CLINIC | Age: 46
End: 2021-05-04

## 2021-05-04 DIAGNOSIS — E11.65 UNCONTROLLED TYPE 2 DIABETES MELLITUS WITH HYPERGLYCEMIA: Primary | ICD-10-CM

## 2021-05-05 ENCOUNTER — OFFICE VISIT (OUTPATIENT)
Dept: PHYSICAL MEDICINE AND REHAB | Facility: CLINIC | Age: 46
End: 2021-05-05
Payer: COMMERCIAL

## 2021-05-05 VITALS
SYSTOLIC BLOOD PRESSURE: 121 MMHG | DIASTOLIC BLOOD PRESSURE: 79 MMHG | RESPIRATION RATE: 14 BRPM | BODY MASS INDEX: 20.57 KG/M2 | HEIGHT: 66 IN | WEIGHT: 128 LBS | HEART RATE: 85 BPM

## 2021-05-05 DIAGNOSIS — M79.18 CERVICAL MYOFASCIAL PAIN SYNDROME: ICD-10-CM

## 2021-05-05 DIAGNOSIS — G56.03 BILATERAL CARPAL TUNNEL SYNDROME: ICD-10-CM

## 2021-05-05 DIAGNOSIS — M54.12 CERVICAL RADICULOPATHY: Primary | ICD-10-CM

## 2021-05-05 PROCEDURE — 99204 OFFICE O/P NEW MOD 45 MIN: CPT | Mod: 25,S$GLB,, | Performed by: PHYSICAL MEDICINE & REHABILITATION

## 2021-05-05 PROCEDURE — 95886 MUSC TEST DONE W/N TEST COMP: CPT | Mod: S$GLB,,, | Performed by: PHYSICAL MEDICINE & REHABILITATION

## 2021-05-05 PROCEDURE — 99204 PR OFFICE/OUTPT VISIT, NEW, LEVL IV, 45-59 MIN: ICD-10-PCS | Mod: 25,S$GLB,, | Performed by: PHYSICAL MEDICINE & REHABILITATION

## 2021-05-05 PROCEDURE — 95886 PR EMG COMPLETE, W/ NERVE CONDUCTION STUDIES, 5+ MUSCLES: ICD-10-PCS | Mod: S$GLB,,, | Performed by: PHYSICAL MEDICINE & REHABILITATION

## 2021-05-05 PROCEDURE — 99999 PR PBB SHADOW E&M-EST. PATIENT-LVL III: CPT | Mod: PBBFAC,,, | Performed by: PHYSICAL MEDICINE & REHABILITATION

## 2021-05-05 PROCEDURE — 95911 NRV CNDJ TEST 9-10 STUDIES: CPT | Mod: S$GLB,,, | Performed by: PHYSICAL MEDICINE & REHABILITATION

## 2021-05-05 PROCEDURE — 95911 PR NERVE CONDUCTION STUDY; 9-10 STUDIES: ICD-10-PCS | Mod: S$GLB,,, | Performed by: PHYSICAL MEDICINE & REHABILITATION

## 2021-05-05 PROCEDURE — 99999 PR PBB SHADOW E&M-EST. PATIENT-LVL III: ICD-10-PCS | Mod: PBBFAC,,, | Performed by: PHYSICAL MEDICINE & REHABILITATION

## 2021-05-14 ENCOUNTER — TELEPHONE (OUTPATIENT)
Dept: INTERNAL MEDICINE | Facility: CLINIC | Age: 46
End: 2021-05-14

## 2021-05-14 ENCOUNTER — LAB VISIT (OUTPATIENT)
Dept: LAB | Facility: HOSPITAL | Age: 46
End: 2021-05-14
Attending: FAMILY MEDICINE
Payer: COMMERCIAL

## 2021-05-14 ENCOUNTER — OFFICE VISIT (OUTPATIENT)
Dept: ORTHOPEDICS | Facility: CLINIC | Age: 46
End: 2021-05-14
Payer: COMMERCIAL

## 2021-05-14 VITALS
BODY MASS INDEX: 20.58 KG/M2 | HEART RATE: 105 BPM | HEIGHT: 66 IN | SYSTOLIC BLOOD PRESSURE: 118 MMHG | DIASTOLIC BLOOD PRESSURE: 83 MMHG | WEIGHT: 128.06 LBS

## 2021-05-14 DIAGNOSIS — M54.2 CERVICALGIA: ICD-10-CM

## 2021-05-14 DIAGNOSIS — M75.41 IMPINGEMENT SYNDROME OF RIGHT SHOULDER: ICD-10-CM

## 2021-05-14 DIAGNOSIS — M47.892 OTHER SPONDYLOSIS, CERVICAL REGION: ICD-10-CM

## 2021-05-14 DIAGNOSIS — M54.12 CERVICAL RADICULOPATHY: Primary | ICD-10-CM

## 2021-05-14 DIAGNOSIS — G89.29 CHRONIC RIGHT SHOULDER PAIN: ICD-10-CM

## 2021-05-14 DIAGNOSIS — M79.18 MYOFASCIAL PAIN ON RIGHT SIDE: ICD-10-CM

## 2021-05-14 DIAGNOSIS — M25.511 CHRONIC RIGHT SHOULDER PAIN: ICD-10-CM

## 2021-05-14 DIAGNOSIS — E11.65 UNCONTROLLED TYPE 2 DIABETES MELLITUS WITH HYPERGLYCEMIA: ICD-10-CM

## 2021-05-14 PROCEDURE — 99999 PR PBB SHADOW E&M-EST. PATIENT-LVL V: CPT | Mod: PBBFAC,,, | Performed by: PHYSICIAN ASSISTANT

## 2021-05-14 PROCEDURE — 99999 PR PBB SHADOW E&M-EST. PATIENT-LVL V: ICD-10-PCS | Mod: PBBFAC,,, | Performed by: PHYSICIAN ASSISTANT

## 2021-05-14 PROCEDURE — 83036 HEMOGLOBIN GLYCOSYLATED A1C: CPT | Performed by: FAMILY MEDICINE

## 2021-05-14 PROCEDURE — 99214 OFFICE O/P EST MOD 30 MIN: CPT | Mod: S$GLB,,, | Performed by: PHYSICIAN ASSISTANT

## 2021-05-14 PROCEDURE — 36415 COLL VENOUS BLD VENIPUNCTURE: CPT | Performed by: FAMILY MEDICINE

## 2021-05-14 PROCEDURE — 99214 PR OFFICE/OUTPT VISIT, EST, LEVL IV, 30-39 MIN: ICD-10-PCS | Mod: S$GLB,,, | Performed by: PHYSICIAN ASSISTANT

## 2021-05-14 RX ORDER — GABAPENTIN 300 MG/1
CAPSULE ORAL
Qty: 90 CAPSULE | Refills: 1 | Status: SHIPPED | OUTPATIENT
Start: 2021-05-14 | End: 2022-01-27

## 2021-05-14 RX ORDER — METHOCARBAMOL 500 MG/1
500 TABLET, FILM COATED ORAL 3 TIMES DAILY PRN
Qty: 30 TABLET | Refills: 1 | Status: SHIPPED | OUTPATIENT
Start: 2021-05-14

## 2021-05-14 RX ORDER — VARENICLINE TARTRATE 0.5 (11)-1
KIT ORAL
Qty: 1 PACKAGE | Refills: 0 | Status: SHIPPED | OUTPATIENT
Start: 2021-05-14 | End: 2021-08-17 | Stop reason: SDUPTHER

## 2021-05-15 LAB
ESTIMATED AVG GLUCOSE: 212 MG/DL (ref 68–131)
HBA1C MFR BLD: 9 % (ref 4–5.6)

## 2021-05-18 ENCOUNTER — PATIENT MESSAGE (OUTPATIENT)
Dept: DIABETES | Facility: CLINIC | Age: 46
End: 2021-05-18

## 2021-05-18 ENCOUNTER — PATIENT OUTREACH (OUTPATIENT)
Dept: DIABETES | Facility: CLINIC | Age: 46
End: 2021-05-18

## 2021-05-25 ENCOUNTER — PATIENT OUTREACH (OUTPATIENT)
Dept: DIABETES | Facility: CLINIC | Age: 46
End: 2021-05-25

## 2021-05-27 ENCOUNTER — TELEPHONE (OUTPATIENT)
Dept: INTERNAL MEDICINE | Facility: CLINIC | Age: 46
End: 2021-05-27

## 2021-05-27 DIAGNOSIS — R10.9 ABDOMINAL PAIN, UNSPECIFIED ABDOMINAL LOCATION: Primary | ICD-10-CM

## 2021-05-28 ENCOUNTER — TELEPHONE (OUTPATIENT)
Dept: PAIN MEDICINE | Facility: CLINIC | Age: 46
End: 2021-05-28

## 2021-06-03 ENCOUNTER — PATIENT OUTREACH (OUTPATIENT)
Dept: ADMINISTRATIVE | Facility: HOSPITAL | Age: 46
End: 2021-06-03

## 2021-06-03 DIAGNOSIS — E11.9 TYPE 2 DIABETES MELLITUS WITHOUT COMPLICATION, UNSPECIFIED WHETHER LONG TERM INSULIN USE: Primary | ICD-10-CM

## 2021-06-04 ENCOUNTER — TELEPHONE (OUTPATIENT)
Dept: PAIN MEDICINE | Facility: CLINIC | Age: 46
End: 2021-06-04

## 2021-06-04 ENCOUNTER — PATIENT MESSAGE (OUTPATIENT)
Dept: PAIN MEDICINE | Facility: CLINIC | Age: 46
End: 2021-06-04

## 2021-06-09 ENCOUNTER — PATIENT OUTREACH (OUTPATIENT)
Dept: ADMINISTRATIVE | Facility: OTHER | Age: 46
End: 2021-06-09

## 2021-06-21 ENCOUNTER — CLINICAL SUPPORT (OUTPATIENT)
Dept: DIABETES | Facility: CLINIC | Age: 46
End: 2021-06-21
Payer: COMMERCIAL

## 2021-06-21 VITALS — BODY MASS INDEX: 19.13 KG/M2 | WEIGHT: 119.06 LBS | HEIGHT: 66 IN

## 2021-06-21 DIAGNOSIS — E11.65 UNCONTROLLED TYPE 2 DIABETES MELLITUS WITH HYPERGLYCEMIA: Primary | ICD-10-CM

## 2021-06-21 PROCEDURE — G0108 PR DIAB MANAGE TRN  PER INDIV: ICD-10-PCS | Mod: S$GLB,,, | Performed by: DIETITIAN, REGISTERED

## 2021-06-21 PROCEDURE — 99999 PR PBB SHADOW E&M-EST. PATIENT-LVL I: ICD-10-PCS | Mod: PBBFAC,,, | Performed by: DIETITIAN, REGISTERED

## 2021-06-21 PROCEDURE — G0108 DIAB MANAGE TRN  PER INDIV: HCPCS | Mod: S$GLB,,, | Performed by: DIETITIAN, REGISTERED

## 2021-06-21 PROCEDURE — 99999 PR PBB SHADOW E&M-EST. PATIENT-LVL I: CPT | Mod: PBBFAC,,, | Performed by: DIETITIAN, REGISTERED

## 2021-06-24 ENCOUNTER — TELEPHONE (OUTPATIENT)
Dept: PAIN MEDICINE | Facility: CLINIC | Age: 46
End: 2021-06-24

## 2021-07-12 ENCOUNTER — TELEPHONE (OUTPATIENT)
Dept: RADIOLOGY | Facility: HOSPITAL | Age: 46
End: 2021-07-12

## 2021-07-13 ENCOUNTER — PATIENT OUTREACH (OUTPATIENT)
Dept: ADMINISTRATIVE | Facility: OTHER | Age: 46
End: 2021-07-13

## 2021-07-20 ENCOUNTER — TELEPHONE (OUTPATIENT)
Dept: PAIN MEDICINE | Facility: CLINIC | Age: 46
End: 2021-07-20

## 2021-07-22 ENCOUNTER — TELEPHONE (OUTPATIENT)
Dept: RADIOLOGY | Facility: HOSPITAL | Age: 46
End: 2021-07-22

## 2021-07-26 ENCOUNTER — TELEPHONE (OUTPATIENT)
Dept: RADIOLOGY | Facility: HOSPITAL | Age: 46
End: 2021-07-26

## 2021-07-28 RX ORDER — FLUCONAZOLE 150 MG/1
150 TABLET ORAL DAILY
Qty: 1 TABLET | Refills: 0 | Status: SHIPPED | OUTPATIENT
Start: 2021-07-28 | End: 2021-07-29

## 2021-08-17 RX ORDER — VARENICLINE TARTRATE 0.5 (11)-1
KIT ORAL
Qty: 1 PACKAGE | Refills: 0 | Status: SHIPPED | OUTPATIENT
Start: 2021-08-17 | End: 2021-10-22

## 2021-08-31 ENCOUNTER — TELEPHONE (OUTPATIENT)
Dept: PAIN MEDICINE | Facility: CLINIC | Age: 46
End: 2021-08-31

## 2021-09-03 RX ORDER — CANAGLIFLOZIN 300 MG/1
300 TABLET, FILM COATED ORAL DAILY
Qty: 90 TABLET | Refills: 0 | Status: SHIPPED | OUTPATIENT
Start: 2021-09-03 | End: 2021-10-22 | Stop reason: SDUPTHER

## 2021-09-28 ENCOUNTER — TELEPHONE (OUTPATIENT)
Dept: INTERNAL MEDICINE | Facility: CLINIC | Age: 46
End: 2021-09-28

## 2021-10-06 ENCOUNTER — TELEPHONE (OUTPATIENT)
Dept: ORTHOPEDICS | Facility: CLINIC | Age: 46
End: 2021-10-06

## 2021-10-11 ENCOUNTER — TELEPHONE (OUTPATIENT)
Dept: INTERNAL MEDICINE | Facility: CLINIC | Age: 46
End: 2021-10-11

## 2021-10-13 DIAGNOSIS — E11.9 TYPE 2 DIABETES MELLITUS WITHOUT COMPLICATION: ICD-10-CM

## 2021-10-20 ENCOUNTER — TELEPHONE (OUTPATIENT)
Dept: ORTHOPEDICS | Facility: CLINIC | Age: 46
End: 2021-10-20

## 2021-10-22 ENCOUNTER — OFFICE VISIT (OUTPATIENT)
Dept: INTERNAL MEDICINE | Facility: CLINIC | Age: 46
End: 2021-10-22
Payer: COMMERCIAL

## 2021-10-22 VITALS
WEIGHT: 125 LBS | BODY MASS INDEX: 20.09 KG/M2 | DIASTOLIC BLOOD PRESSURE: 72 MMHG | HEART RATE: 92 BPM | TEMPERATURE: 97 F | SYSTOLIC BLOOD PRESSURE: 120 MMHG | OXYGEN SATURATION: 96 % | HEIGHT: 66 IN | RESPIRATION RATE: 18 BRPM

## 2021-10-22 DIAGNOSIS — B35.4 TINEA CORPORIS: ICD-10-CM

## 2021-10-22 DIAGNOSIS — R20.0 NUMBNESS: ICD-10-CM

## 2021-10-22 DIAGNOSIS — E11.65 UNCONTROLLED TYPE 2 DIABETES MELLITUS WITH HYPERGLYCEMIA: ICD-10-CM

## 2021-10-22 DIAGNOSIS — R63.4 WEIGHT LOSS, NON-INTENTIONAL: Primary | ICD-10-CM

## 2021-10-22 DIAGNOSIS — M54.9 DISCOMFORT OF BACK: ICD-10-CM

## 2021-10-22 DIAGNOSIS — R23.9 UNSPECIFIED SKIN CHANGES: ICD-10-CM

## 2021-10-22 DIAGNOSIS — F90.9 ATTENTION DEFICIT HYPERACTIVITY DISORDER (ADHD), UNSPECIFIED ADHD TYPE: ICD-10-CM

## 2021-10-22 LAB
BACTERIA #/AREA URNS HPF: NORMAL /HPF
BILIRUB UR QL STRIP: NEGATIVE
CLARITY UR: CLEAR
COLOR UR: YELLOW
GLUCOSE UR QL STRIP: ABNORMAL
HGB UR QL STRIP: NEGATIVE
KETONES UR QL STRIP: NEGATIVE
LEUKOCYTE ESTERASE UR QL STRIP: NEGATIVE
MICROSCOPIC COMMENT: NORMAL
NITRITE UR QL STRIP: NEGATIVE
PH UR STRIP: 6 [PH] (ref 5–8)
PROT UR QL STRIP: NEGATIVE
SP GR UR STRIP: 1.01 (ref 1–1.03)
SQUAMOUS #/AREA URNS HPF: 3 /HPF
URN SPEC COLLECT METH UR: ABNORMAL
UROBILINOGEN UR STRIP-ACNC: NEGATIVE EU/DL
WBC #/AREA URNS HPF: 1 /HPF (ref 0–5)
YEAST URNS QL MICRO: NORMAL

## 2021-10-22 PROCEDURE — 99215 OFFICE O/P EST HI 40 MIN: CPT | Mod: S$GLB,,, | Performed by: FAMILY MEDICINE

## 2021-10-22 PROCEDURE — 99999 PR PBB SHADOW E&M-EST. PATIENT-LVL V: CPT | Mod: PBBFAC,,, | Performed by: FAMILY MEDICINE

## 2021-10-22 PROCEDURE — 99215 PR OFFICE/OUTPT VISIT, EST, LEVL V, 40-54 MIN: ICD-10-PCS | Mod: S$GLB,,, | Performed by: FAMILY MEDICINE

## 2021-10-22 PROCEDURE — 81000 URINALYSIS NONAUTO W/SCOPE: CPT | Performed by: FAMILY MEDICINE

## 2021-10-22 PROCEDURE — 87086 URINE CULTURE/COLONY COUNT: CPT | Performed by: FAMILY MEDICINE

## 2021-10-22 PROCEDURE — 99999 PR PBB SHADOW E&M-EST. PATIENT-LVL V: ICD-10-PCS | Mod: PBBFAC,,, | Performed by: FAMILY MEDICINE

## 2021-10-22 RX ORDER — CLOTRIMAZOLE AND BETAMETHASONE DIPROPIONATE 10; .64 MG/G; MG/G
CREAM TOPICAL 2 TIMES DAILY
Qty: 45 G | Refills: 0 | Status: SHIPPED | OUTPATIENT
Start: 2021-10-22

## 2021-10-22 RX ORDER — PANTOPRAZOLE SODIUM 40 MG/1
40 TABLET, DELAYED RELEASE ORAL DAILY
Qty: 90 TABLET | Refills: 0 | Status: SHIPPED | OUTPATIENT
Start: 2021-10-22 | End: 2022-10-22

## 2021-10-22 RX ORDER — DEXTROAMPHETAMINE SACCHARATE, AMPHETAMINE ASPARTATE, DEXTROAMPHETAMINE SULFATE AND AMPHETAMINE SULFATE 5; 5; 5; 5 MG/1; MG/1; MG/1; MG/1
1 TABLET ORAL DAILY
Qty: 30 TABLET | Refills: 0 | Status: SHIPPED | OUTPATIENT
Start: 2021-10-22

## 2021-10-22 RX ORDER — DEXTROAMPHETAMINE SACCHARATE, AMPHETAMINE ASPARTATE, DEXTROAMPHETAMINE SULFATE AND AMPHETAMINE SULFATE 5; 5; 5; 5 MG/1; MG/1; MG/1; MG/1
1 TABLET ORAL DAILY
Qty: 30 TABLET | Refills: 0 | Status: SHIPPED | OUTPATIENT
Start: 2021-10-22 | End: 2021-10-22 | Stop reason: SDUPTHER

## 2021-10-22 RX ORDER — GLYBURIDE-METFORMIN HYDROCHLORIDE 5; 500 MG/1; MG/1
2 TABLET ORAL 2 TIMES DAILY WITH MEALS
Qty: 360 TABLET | Refills: 1 | Status: SHIPPED | OUTPATIENT
Start: 2021-10-22 | End: 2022-01-27 | Stop reason: SDUPTHER

## 2021-10-22 RX ORDER — CANAGLIFLOZIN 300 MG/1
300 TABLET, FILM COATED ORAL DAILY
Qty: 90 TABLET | Refills: 0 | Status: SHIPPED | OUTPATIENT
Start: 2021-10-22 | End: 2022-01-27 | Stop reason: SDUPTHER

## 2021-10-22 RX ORDER — CLOTRIMAZOLE AND BETAMETHASONE DIPROPIONATE 10; .64 MG/G; MG/G
CREAM TOPICAL 2 TIMES DAILY
Qty: 45 G | Refills: 0 | Status: SHIPPED | OUTPATIENT
Start: 2021-10-22 | End: 2021-10-22 | Stop reason: SDUPTHER

## 2021-10-23 LAB — BACTERIA UR CULT: NORMAL

## 2021-10-28 ENCOUNTER — LAB VISIT (OUTPATIENT)
Dept: LAB | Facility: HOSPITAL | Age: 46
End: 2021-10-28
Attending: FAMILY MEDICINE
Payer: COMMERCIAL

## 2021-10-28 DIAGNOSIS — R63.4 WEIGHT LOSS, NON-INTENTIONAL: ICD-10-CM

## 2021-10-28 PROCEDURE — 87046 STOOL CULTR AEROBIC BACT EA: CPT | Mod: 59 | Performed by: FAMILY MEDICINE

## 2021-10-28 PROCEDURE — 87177 OVA AND PARASITES SMEARS: CPT | Performed by: FAMILY MEDICINE

## 2021-10-28 PROCEDURE — 87209 SMEAR COMPLEX STAIN: CPT | Performed by: FAMILY MEDICINE

## 2021-10-28 PROCEDURE — 87427 SHIGA-LIKE TOXIN AG IA: CPT | Performed by: FAMILY MEDICINE

## 2021-10-28 PROCEDURE — 87329 GIARDIA AG IA: CPT | Performed by: FAMILY MEDICINE

## 2021-10-28 PROCEDURE — 82272 OCCULT BLD FECES 1-3 TESTS: CPT | Performed by: FAMILY MEDICINE

## 2021-10-28 PROCEDURE — 87045 FECES CULTURE AEROBIC BACT: CPT | Performed by: FAMILY MEDICINE

## 2021-10-29 ENCOUNTER — TELEPHONE (OUTPATIENT)
Dept: INTERNAL MEDICINE | Facility: CLINIC | Age: 46
End: 2021-10-29
Payer: COMMERCIAL

## 2021-10-29 LAB
CRYPTOSP AG STL QL IA: NEGATIVE
G LAMBLIA AG STL QL IA: NEGATIVE
OB PNL STL: NEGATIVE

## 2021-11-01 LAB
E COLI SXT1 STL QL IA: NEGATIVE
E COLI SXT2 STL QL IA: NEGATIVE

## 2021-11-02 LAB
BACTERIA STL CULT: NORMAL
O+P STL MICRO: NORMAL

## 2021-11-04 ENCOUNTER — PATIENT OUTREACH (OUTPATIENT)
Dept: ADMINISTRATIVE | Facility: OTHER | Age: 46
End: 2021-11-04
Payer: COMMERCIAL

## 2021-11-04 ENCOUNTER — OFFICE VISIT (OUTPATIENT)
Dept: GASTROENTEROLOGY | Facility: CLINIC | Age: 46
End: 2021-11-04
Payer: COMMERCIAL

## 2021-11-04 VITALS
HEART RATE: 79 BPM | HEIGHT: 66 IN | WEIGHT: 123.56 LBS | BODY MASS INDEX: 19.86 KG/M2 | SYSTOLIC BLOOD PRESSURE: 116 MMHG | OXYGEN SATURATION: 99 % | DIASTOLIC BLOOD PRESSURE: 70 MMHG

## 2021-11-04 DIAGNOSIS — R63.4 WEIGHT LOSS, NON-INTENTIONAL: ICD-10-CM

## 2021-11-04 PROCEDURE — 99203 OFFICE O/P NEW LOW 30 MIN: CPT | Mod: S$GLB,,, | Performed by: INTERNAL MEDICINE

## 2021-11-04 PROCEDURE — 99203 PR OFFICE/OUTPT VISIT, NEW, LEVL III, 30-44 MIN: ICD-10-PCS | Mod: S$GLB,,, | Performed by: INTERNAL MEDICINE

## 2021-11-04 PROCEDURE — 99999 PR PBB SHADOW E&M-EST. PATIENT-LVL IV: ICD-10-PCS | Mod: PBBFAC,,, | Performed by: INTERNAL MEDICINE

## 2021-11-04 PROCEDURE — 99999 PR PBB SHADOW E&M-EST. PATIENT-LVL IV: CPT | Mod: PBBFAC,,, | Performed by: INTERNAL MEDICINE

## 2021-11-05 ENCOUNTER — TELEPHONE (OUTPATIENT)
Dept: INTERNAL MEDICINE | Facility: CLINIC | Age: 46
End: 2021-11-05
Payer: COMMERCIAL

## 2021-11-05 DIAGNOSIS — E11.65 UNCONTROLLED TYPE 2 DIABETES MELLITUS WITH HYPERGLYCEMIA: ICD-10-CM

## 2021-11-17 ENCOUNTER — HOSPITAL ENCOUNTER (OUTPATIENT)
Dept: RADIOLOGY | Facility: HOSPITAL | Age: 46
Discharge: HOME | End: 2021-11-17
Attending: PHYSICIAN ASSISTANT
Payer: COMMERCIAL

## 2021-11-17 DIAGNOSIS — M54.12 CERVICAL RADICULOPATHY: ICD-10-CM

## 2021-11-17 DIAGNOSIS — E11.9 TYPE 2 DIABETES MELLITUS WITHOUT COMPLICATION: ICD-10-CM

## 2021-11-17 DIAGNOSIS — M54.2 CERVICALGIA: ICD-10-CM

## 2021-11-17 DIAGNOSIS — M25.511 CHRONIC RIGHT SHOULDER PAIN: ICD-10-CM

## 2021-11-17 DIAGNOSIS — M79.18 MYOFASCIAL PAIN ON RIGHT SIDE: ICD-10-CM

## 2021-11-17 DIAGNOSIS — G89.29 CHRONIC RIGHT SHOULDER PAIN: ICD-10-CM

## 2021-11-17 PROCEDURE — 72141 MRI NECK SPINE W/O DYE: CPT | Mod: TC

## 2021-11-17 PROCEDURE — 73221 MRI JOINT UPR EXTREM W/O DYE: CPT | Mod: TC,RT

## 2021-12-02 ENCOUNTER — TELEPHONE (OUTPATIENT)
Dept: PAIN MEDICINE | Facility: CLINIC | Age: 46
End: 2021-12-02
Payer: COMMERCIAL

## 2021-12-03 ENCOUNTER — OFFICE VISIT (OUTPATIENT)
Dept: SPORTS MEDICINE | Facility: CLINIC | Age: 46
End: 2021-12-03
Payer: COMMERCIAL

## 2021-12-03 VITALS — BODY MASS INDEX: 19.77 KG/M2 | WEIGHT: 123 LBS | HEIGHT: 66 IN

## 2021-12-03 DIAGNOSIS — G56.03 BILATERAL CARPAL TUNNEL SYNDROME: ICD-10-CM

## 2021-12-03 DIAGNOSIS — M54.12 CERVICAL RADICULOPATHY: Primary | ICD-10-CM

## 2021-12-03 DIAGNOSIS — M79.18 MYOFASCIAL PAIN: ICD-10-CM

## 2021-12-03 PROCEDURE — 99204 OFFICE O/P NEW MOD 45 MIN: CPT | Mod: S$GLB,,, | Performed by: PHYSICAL MEDICINE & REHABILITATION

## 2021-12-03 PROCEDURE — 99204 PR OFFICE/OUTPT VISIT, NEW, LEVL IV, 45-59 MIN: ICD-10-PCS | Mod: S$GLB,,, | Performed by: PHYSICAL MEDICINE & REHABILITATION

## 2021-12-03 PROCEDURE — 99999 PR PBB SHADOW E&M-EST. PATIENT-LVL IV: CPT | Mod: PBBFAC,,, | Performed by: PHYSICAL MEDICINE & REHABILITATION

## 2021-12-03 PROCEDURE — 99999 PR PBB SHADOW E&M-EST. PATIENT-LVL IV: ICD-10-PCS | Mod: PBBFAC,,, | Performed by: PHYSICAL MEDICINE & REHABILITATION

## 2021-12-03 RX ORDER — MELOXICAM 15 MG/1
15 TABLET ORAL DAILY
Qty: 30 TABLET | Refills: 0 | Status: SHIPPED | OUTPATIENT
Start: 2021-12-03 | End: 2022-07-26 | Stop reason: SDUPTHER

## 2021-12-03 RX ORDER — GABAPENTIN 300 MG/1
300 CAPSULE ORAL 3 TIMES DAILY
Qty: 30 CAPSULE | Refills: 0 | Status: SHIPPED | OUTPATIENT
Start: 2021-12-03 | End: 2022-01-27

## 2021-12-20 ENCOUNTER — PATIENT OUTREACH (OUTPATIENT)
Dept: ADMINISTRATIVE | Facility: OTHER | Age: 46
End: 2021-12-20
Payer: COMMERCIAL

## 2021-12-20 DIAGNOSIS — Z12.31 ENCOUNTER FOR SCREENING MAMMOGRAM FOR BREAST CANCER: Primary | ICD-10-CM

## 2021-12-22 ENCOUNTER — OFFICE VISIT (OUTPATIENT)
Dept: PAIN MEDICINE | Facility: CLINIC | Age: 46
End: 2021-12-22
Payer: COMMERCIAL

## 2021-12-22 ENCOUNTER — TELEPHONE (OUTPATIENT)
Dept: PAIN MEDICINE | Facility: CLINIC | Age: 46
End: 2021-12-22

## 2021-12-22 VITALS
WEIGHT: 122.56 LBS | BODY MASS INDEX: 19.7 KG/M2 | HEART RATE: 93 BPM | HEIGHT: 66 IN | DIASTOLIC BLOOD PRESSURE: 73 MMHG | SYSTOLIC BLOOD PRESSURE: 120 MMHG

## 2021-12-22 DIAGNOSIS — M47.812 CERVICAL SPONDYLOSIS: ICD-10-CM

## 2021-12-22 DIAGNOSIS — M54.12 CERVICAL RADICULOPATHY: Primary | ICD-10-CM

## 2021-12-22 DIAGNOSIS — M50.222 HERNIATED NUCLEUS PULPOSUS, C5-6: ICD-10-CM

## 2021-12-22 PROCEDURE — 99999 PR PBB SHADOW E&M-EST. PATIENT-LVL III: ICD-10-PCS | Mod: PBBFAC,,, | Performed by: ANESTHESIOLOGY

## 2021-12-22 PROCEDURE — 99204 PR OFFICE/OUTPT VISIT, NEW, LEVL IV, 45-59 MIN: ICD-10-PCS | Mod: S$GLB,,, | Performed by: ANESTHESIOLOGY

## 2021-12-22 PROCEDURE — 99999 PR PBB SHADOW E&M-EST. PATIENT-LVL III: CPT | Mod: PBBFAC,,, | Performed by: ANESTHESIOLOGY

## 2021-12-22 PROCEDURE — 99204 OFFICE O/P NEW MOD 45 MIN: CPT | Mod: S$GLB,,, | Performed by: ANESTHESIOLOGY

## 2021-12-28 ENCOUNTER — TELEPHONE (OUTPATIENT)
Dept: PAIN MEDICINE | Facility: CLINIC | Age: 46
End: 2021-12-28
Payer: COMMERCIAL

## 2022-01-02 ENCOUNTER — PATIENT MESSAGE (OUTPATIENT)
Dept: PAIN MEDICINE | Facility: HOSPITAL | Age: 47
End: 2022-01-02
Payer: COMMERCIAL

## 2022-01-03 ENCOUNTER — TELEPHONE (OUTPATIENT)
Dept: PAIN MEDICINE | Facility: CLINIC | Age: 47
End: 2022-01-03
Payer: COMMERCIAL

## 2022-01-03 NOTE — TELEPHONE ENCOUNTER
I called the patient to rescheduled her procedure that was originally scheduled for today.  The patient does not wish to reschedule at this time.  She will call back to reschedule.

## 2022-01-04 ENCOUNTER — TELEPHONE (OUTPATIENT)
Dept: PAIN MEDICINE | Facility: CLINIC | Age: 47
End: 2022-01-04
Payer: COMMERCIAL

## 2022-01-04 NOTE — TELEPHONE ENCOUNTER
----- Message from Yari Murry sent at 1/4/2022  2:30 PM CST -----  Contact: PT  .Type:  Patient Returning Call    Who Called: Gonzalo   Who Left Message for Patient:   Does the patient know what this is regarding?: medical reports/ injections   Would the patient rather a call back or a response via [employer]?  Callback  Best Call Back Number: .[phone] (home)      Additional Information:

## 2022-01-04 NOTE — TELEPHONE ENCOUNTER
I spoke with the patient.  She is needing a copy of her last visit note from you explaining what you found and what procedure you are wanting to do?  She needs tthis to take to her  who will possibly be paying for the procedure.  We can not give this to the  but is my understanding that the patient can have a copy  Please advise.  Thanks.

## 2022-01-05 ENCOUNTER — PATIENT MESSAGE (OUTPATIENT)
Dept: SPORTS MEDICINE | Facility: CLINIC | Age: 47
End: 2022-01-05
Payer: COMMERCIAL

## 2022-01-05 DIAGNOSIS — F32.A DEPRESSION, UNSPECIFIED DEPRESSION TYPE: Primary | ICD-10-CM

## 2022-01-05 NOTE — TELEPHONE ENCOUNTER
I spoke with the patient and conveyed the message below.  I gave her the number to medical records [phone].  The patient verbalized understanding.  All questions answered.

## 2022-01-07 ENCOUNTER — TELEPHONE (OUTPATIENT)
Dept: INTERNAL MEDICINE | Facility: CLINIC | Age: 47
End: 2022-01-07
Payer: COMMERCIAL

## 2022-01-07 NOTE — TELEPHONE ENCOUNTER
----- Message from Brittni Live sent at 1/7/2022 12:26 PM CST -----  Contact: pt  The pt request a return call, no additional info given and can be reached at [phone]///thxMW

## 2022-01-12 ENCOUNTER — TELEPHONE (OUTPATIENT)
Dept: INTERNAL MEDICINE | Facility: CLINIC | Age: 47
End: 2022-01-12
Payer: COMMERCIAL

## 2022-01-12 NOTE — TELEPHONE ENCOUNTER
----- Message from Arnaud Doherty sent at 1/11/2022  2:32 PM CST -----  Contact: [phone]  Patient is calling in requesting a call back she stated she has been sick since Saturday. She sis requesting a to see if she can get something saying that like an excuse or something. Please call her back at [phone]  Thanks.ln

## 2022-01-25 ENCOUNTER — TELEPHONE (OUTPATIENT)
Dept: SPORTS MEDICINE | Facility: CLINIC | Age: 47
End: 2022-01-25
Payer: COMMERCIAL

## 2022-01-25 NOTE — TELEPHONE ENCOUNTER
Spoke with patient in regards to physical therapy.  She wanted to know how long she would be attending.  Explained to patient that we leave that between the physical therapist and the patient to determine.  Patient was also concerned about not being contacted yet for psychology referral.  Told patient that follow up on this would be performed immediately.  Sent message to [facility] psychology staff and asked if they would reach out to patient.     ----- Message from Lilia Villegas sent at 1/25/2022  2:35 PM CST -----  Contact: Gonzalo Sánchez called regarding physical therapy, please give her a call back at [phone] (home)     Thanks  kb

## 2022-01-26 ENCOUNTER — TELEPHONE (OUTPATIENT)
Dept: SPORTS MEDICINE | Facility: CLINIC | Age: 47
End: 2022-01-26
Payer: COMMERCIAL

## 2022-01-26 ENCOUNTER — TELEPHONE (OUTPATIENT)
Dept: PAIN MEDICINE | Facility: CLINIC | Age: 47
End: 2022-01-26
Payer: COMMERCIAL

## 2022-01-26 NOTE — TELEPHONE ENCOUNTER
----- Message from Chapis Olvera sent at 1/26/2022  4:15 PM CST -----  Contact: [phone]  Patient is calling regarding arm pain. Please call back at [phone]. Thanks/dj

## 2022-01-26 NOTE — TELEPHONE ENCOUNTER
I spoke with the patient.  Calling about her arm pain.  States the pan is worst than a 10.  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  She is waiting on her  before she can have the injection done with you.  She is on Gabapentin 300 mg, two tabs at night.  Would like to know if there is anything else that can be done.  I told the patient that you were gone for the day but that I would send the message to you and we would call her back on tomorrow.  The patient verbalized understanding.  All questions answered.    Please advise.    Last seen you 12/22/2021  Next visit:  2/2/22

## 2022-01-26 NOTE — TELEPHONE ENCOUNTER
Returned patient call and while on the phone patient received a call from Dr. Andrade's nurse who is setting up care for the arm.    ----- Message from Chapis Olvera sent at 1/26/2022  4:16 PM CST -----  Contact: [phone]  Patient is calling regarding arm pain . Please call back at [phone]. Thanks/dj

## 2022-01-27 ENCOUNTER — PATIENT MESSAGE (OUTPATIENT)
Dept: PAIN MEDICINE | Facility: CLINIC | Age: 47
End: 2022-01-27
Payer: COMMERCIAL

## 2022-01-27 RX ORDER — GLYBURIDE-METFORMIN HYDROCHLORIDE 5; 500 MG/1; MG/1
2 TABLET ORAL 2 TIMES DAILY WITH MEALS
Qty: 360 TABLET | Refills: 0 | Status: SHIPPED | OUTPATIENT
Start: 2022-01-27 | End: 2022-06-01 | Stop reason: SDUPTHER

## 2022-01-27 RX ORDER — GABAPENTIN 300 MG/1
CAPSULE ORAL
Qty: 138 CAPSULE | Refills: 0 | Status: SHIPPED | OUTPATIENT
Start: 2022-01-27 | End: 2022-07-26 | Stop reason: SDUPTHER

## 2022-01-27 RX ORDER — CANAGLIFLOZIN 300 MG/1
300 TABLET, FILM COATED ORAL DAILY
Qty: 90 TABLET | Refills: 0 | Status: SHIPPED | OUTPATIENT
Start: 2022-01-27 | End: 2022-01-31 | Stop reason: SDUPTHER

## 2022-01-27 NOTE — TELEPHONE ENCOUNTER
Care Due:                  Date            Visit Type   Department     Provider  --------------------------------------------------------------------------------                                SAME DAY -                              ESTABLISHED   [facility]  Last Visit: 10-      PATIENT      MEDICINE       Analiliayen Dobson  Next Visit: None Scheduled  None         None Found                                                            Last  Test          Frequency    Reason                     Performed    Due Date  --------------------------------------------------------------------------------    HBA1C.......  6 months...  INVOKANA,                  10-   04-                             glyBURIDE-metformin,                             liraglutide..............    Powered by [email or web address]. Reference number: [id number].   1/27/2022 8:54:10 AM CST

## 2022-01-27 NOTE — TELEPHONE ENCOUNTER
----- Message from Abdi De La Torre sent at 1/27/2022  8:36 AM CST -----  Contact: PT  Pt is calling requesting a call back needing to see when to come in for her refills. Call back at [phone]

## 2022-01-31 NOTE — TELEPHONE ENCOUNTER
----- Message from Brittni Live sent at 1/31/2022  2:18 PM CST -----  Contact: pt  The pt request a return call concerning a med refill, no additional info given and can be reached at [phone]///thxMW

## 2022-01-31 NOTE — TELEPHONE ENCOUNTER
lov 10/22/21  Pt request a 30 supply of invokana sent to local pharmacy until mail order comes in

## 2022-01-31 NOTE — TELEPHONE ENCOUNTER
No new care gaps identified.  Powered by [employer] by [facility]. Reference number: [id number].   1/31/2022 3:07:53 PM CST

## 2022-02-01 RX ORDER — CANAGLIFLOZIN 300 MG/1
300 TABLET, FILM COATED ORAL DAILY
Qty: 30 TABLET | Refills: 0 | Status: SHIPPED | OUTPATIENT
Start: 2022-02-01 | End: 2022-06-01 | Stop reason: SDUPTHER

## 2022-02-02 DIAGNOSIS — E11.9 TYPE 2 DIABETES MELLITUS WITHOUT COMPLICATION: ICD-10-CM

## 2022-02-04 ENCOUNTER — TELEPHONE (OUTPATIENT)
Dept: PAIN MEDICINE | Facility: CLINIC | Age: 47
End: 2022-02-04
Payer: COMMERCIAL

## 2022-02-04 NOTE — TELEPHONE ENCOUNTER
----- Message from Stephanie Fischer sent at 2/4/2022  8:08 AM CST -----  Regarding: Neck Pain  Contact: Patient  Patient would like a call back concerning scheduling her injection, she is having a lot of neck pain. Please call to advise at Ph .[phone] (home)

## 2022-02-04 NOTE — TELEPHONE ENCOUNTER
Tried to call. No answer. Left   Alexis Boyd, MA [facility]   [facility]

## 2022-02-04 NOTE — TELEPHONE ENCOUNTER
----- Message from Sinai Orozco sent at 2/4/2022  9:25 AM CST -----  Regarding: rtn call  Contact: pt  Pt returning call to office. Pt can be reached at [phone]

## 2022-02-04 NOTE — TELEPHONE ENCOUNTER
Tried to call. No answer. Left   Alexis Boyd, MA [facility]   [facility]

## 2022-02-14 ENCOUNTER — PATIENT OUTREACH (OUTPATIENT)
Dept: ADMINISTRATIVE | Facility: HOSPITAL | Age: 47
End: 2022-02-14
Payer: COMMERCIAL

## 2022-02-14 NOTE — PROGRESS NOTES
DM Report: Attempting to contact pt to schedule diabetic labs. Unable to reach patient at this time. Left voicemail.

## 2022-02-15 ENCOUNTER — OFFICE VISIT (OUTPATIENT)
Dept: OTOLARYNGOLOGY | Facility: CLINIC | Age: 47
End: 2022-02-15
Payer: COMMERCIAL

## 2022-02-15 ENCOUNTER — TELEPHONE (OUTPATIENT)
Dept: INTERNAL MEDICINE | Facility: CLINIC | Age: 47
End: 2022-02-15
Payer: COMMERCIAL

## 2022-02-15 DIAGNOSIS — J01.90 ACUTE SINUSITIS, RECURRENCE NOT SPECIFIED, UNSPECIFIED LOCATION: ICD-10-CM

## 2022-02-15 DIAGNOSIS — J45.998 POST-VIRAL REACTIVE AIRWAY DISEASE: Primary | ICD-10-CM

## 2022-02-15 DIAGNOSIS — J01.90 ACUTE SINUSITIS, RECURRENCE NOT SPECIFIED, UNSPECIFIED LOCATION: Primary | ICD-10-CM

## 2022-02-15 PROCEDURE — 99999 PR PBB SHADOW E&M-EST. PATIENT-LVL II: CPT | Mod: PBBFAC,,, | Performed by: STUDENT IN AN ORGANIZED HEALTH CARE EDUCATION/TRAINING PROGRAM

## 2022-02-15 PROCEDURE — 99203 OFFICE O/P NEW LOW 30 MIN: CPT | Mod: 25,S$GLB,, | Performed by: STUDENT IN AN ORGANIZED HEALTH CARE EDUCATION/TRAINING PROGRAM

## 2022-02-15 PROCEDURE — 31231 PR NASAL ENDOSCOPY, DX: ICD-10-PCS | Mod: S$GLB,,, | Performed by: STUDENT IN AN ORGANIZED HEALTH CARE EDUCATION/TRAINING PROGRAM

## 2022-02-15 PROCEDURE — 99203 PR OFFICE/OUTPT VISIT, NEW, LEVL III, 30-44 MIN: ICD-10-PCS | Mod: 25,S$GLB,, | Performed by: STUDENT IN AN ORGANIZED HEALTH CARE EDUCATION/TRAINING PROGRAM

## 2022-02-15 PROCEDURE — 31231 NASAL ENDOSCOPY DX: CPT | Mod: S$GLB,,, | Performed by: STUDENT IN AN ORGANIZED HEALTH CARE EDUCATION/TRAINING PROGRAM

## 2022-02-15 PROCEDURE — 99999 PR PBB SHADOW E&M-EST. PATIENT-LVL II: ICD-10-PCS | Mod: PBBFAC,,, | Performed by: STUDENT IN AN ORGANIZED HEALTH CARE EDUCATION/TRAINING PROGRAM

## 2022-02-15 RX ORDER — FLUOCINOLONE ACETONIDE 0.11 MG/ML
3 OIL AURICULAR (OTIC) 2 TIMES DAILY
Qty: 20 ML | Refills: 0 | Status: SHIPPED | OUTPATIENT
Start: 2022-02-15 | End: 2022-02-22

## 2022-02-15 RX ORDER — FLUTICASONE PROPIONATE 50 MCG
1 SPRAY, SUSPENSION (ML) NASAL DAILY
Qty: 16 G | Refills: 0 | Status: SHIPPED | OUTPATIENT
Start: 2022-02-15

## 2022-02-15 RX ORDER — IPRATROPIUM BROMIDE 21 UG/1
2 SPRAY, METERED NASAL 2 TIMES DAILY
Qty: 30 ML | Refills: 0 | Status: SHIPPED | OUTPATIENT
Start: 2022-02-15

## 2022-02-15 NOTE — TELEPHONE ENCOUNTER
Called and spoke with patient. She is requesting an ENT referral to see why does she keep having issues with her ears and sinuses. She said this has been going on since she received the second covid vaccine. I offered appointment with internal med patient declined she only wants to see ENT.

## 2022-02-15 NOTE — PROGRESS NOTES
Chief complaint:  No chief complaint on file.       Referring Provider:  [place]  [address] [place] [address]      History of present illness:     Ms. Cordova is a 46 y.o. presenting for evaluation of sinonasal symptoms.     She  has been referred by Dr. Cornell.      Started one week after getting second COVID vaccine in December. She was acutely ill. Tested negative at that time. After that, she recovered, but now has persistent thick mucus. Runs down the back of her throat. Causing mild throat irritation at times. Also endorses recurrent ear effusions. Primary ear symptoms now are ear itching. Tried candling.      Denies nasal obstruction, hyposmia, fever, purulent anterior rhinorrhea, facial pressure/pain, coughing, shortness of breath, fever.       She does have a remote history of a frontal sinus fracture in 2002.    History      Past Medical History:   Past Medical History:   Diagnosis Date    Carpal tunnel syndrome of left wrist 06/04/2015    EMG 6/4/15 Dr Stafford moderate L, negative R    Chest pain syndrome 11/6/2014    Diabetes mellitus 11/6/2014    DM (diabetes mellitus) 2008     02/01/2021     Hyperlipidemia          Past Surgical History:  Past Surgical History:   Procedure Laterality Date    BILATERAL TUBAL LIGATION  2000    CARPAL TUNNEL RELEASE Left 2014    COLONOSCOPY      ESOPHAGOGASTRODUODENOSCOPY      HYSTERECTOMY  2015    hys only; heavy cycles    LITHOTRIPSY           Medications: Medication list reviewed. She  has a current medication list which includes the following prescription(s): clotrimazole-betamethasone 1-0.05%, dextroamphetamine-amphetamine, famotidine, gabapentin, glyburide-metformin 5-500 mg, invokana, liraglutide 0.6 mg/0.1 ml (18 mg/3 ml) subq pnij, meloxicam, methocarbamol, pantoprazole, and vitamin d2.     Allergies: Review of patient's allergies indicates:  No Known Allergies      Family history: family history includes  Breast cancer in her maternal aunt and mother; Diabetes in her brother, maternal aunt, maternal uncle, paternal aunt, and paternal grandmother; Hypertension in her father, maternal grandfather, and paternal grandmother; Stroke in her father.         Social History          Alcohol use:  reports current alcohol use.            Tobacco:  reports that she quit smoking about 7 years ago. Her smoking use included cigars. She started smoking about 7 years ago. She smoked 1.00 pack per day. She has never used smokeless tobacco.         Physical Examination      Vitals: There were no vitals taken for this visit.      General: Well developed, well nourished, well hydrated.     Voice: no dysphonia, no dysarthria      Head/Face: Normocephalic, atraumatic. No scars or lesions. Facial musculature equal.     Eyes: No scleral icterus or conjunctival hemorrhage. EOMI. PERRLA.     Ears:     · Right ear: No gross deformity. EAC is clear of debris and erythema. TM are intact with a pneumatized middle ear. No signs of retraction, fluid or infection.      · Left ear: No gross deformity. EAC is clear of debris and erythema. TM are intact with a pneumatized middle ear. No signs of retraction, fluid or infection.      Nose: No gross deformity or lesions. No purulent discharge. No significant NSD.    Mouth/Oropharynx: Lips without any lesions. No mucosal lesions within the oropharynx. No tonsillar exudate or lesions. Pharyngeal walls symmetrical. Uvula midline. Tongue midline without lesions.     Neurologic: Moving all extremities without gross abnormality.CN II-XII grossly intact. House-Brackmann 1/6. No signs of nystagmus.          Data reviewed      Review of records:      I reviewed records from the referring provider's office including the history, workup, and/or treatment of this problem thus far:     Laboratory:      WBC 8.43  hgb 14.1  plt 248  eos  1.2%    A1c 11.1 (up from 9.0)    Procedures:    Procedure Note - Rigid Nasal  Endoscopy     Surgeon: Madi Obregon MD  Anesthesia: topical oxymetazoline and 4% lidocaine.    Technique: The nose was sprayed with oxymetazoline and 4% lidocaine. With the patient in the upright position, a 2.7mm 30-degree endscope was inserted into the patient's right and left nare.  Where visible, nasal secretions and mucosal crusting were removed with a suction. The overall appearance of the nasal cavity and paranasal sinuses were noted and the findings are described below.     Findings: The nasal septum was intact with right midseptal deviation.  The inferior turbinates were not enlarged. There was not any evidence of nasal polyps, masses, or lesions within the nasal cavity.  Mucopurulent drainage was not noted at the middle meatus, frontal recess, or sphenoethmoidal recess.  Copious clear secretions along floor of nasal cavity and into NP     Assessment/Plan:    Post-viral upper airway disease/post nasal drip  Will treat consistent daily use of Flonase and Atrovent. Can also use mucinex for thinning of mucus as needed. She will return in 3-4 weeks if symptoms not improved      Ear itching  Avoid q-tips or manipulation  No ear candling  dermotic x 7 days        Madi Obregon MD  [facility]   [facility]  [address] [facility] [place] [address]  P: [phone]  F: [phone]

## 2022-02-15 NOTE — LETTER
February 15, 2022    Gonzalo Cordova  [address] [address]             [facility]  Otol[facility]ology  [id number] [place] [id number]  Phone: [phone]  Fax: [phone]   February 15, 2022     Patient: Gonzalo Cordova   YOB: 1975   Date of Visit: 2/15/2022       To Whom it May Concern:    Gonzalo Cordova was seen in my clinic on 2/15/2022. She may return to work on 2/16/2022.    Please excuse her from any classes or work missed.    If you have any questions or concerns, please don't hesitate to call.    Sincerely,         Madi Obregon MD

## 2022-02-15 NOTE — TELEPHONE ENCOUNTER
----- Message from Amadou Wallis sent at 2/14/2022  5:13 PM CST -----  Regarding: Referral  Contact: [phone]  Patient Requesting Referral    Who Called: Gonzalo    Does the patient already have the specialty appointment scheduled?: No    If yes, what is the date of that appointment?:    Referral to What Specialty: ENT    Reason for Referral: Pt would not say    Does the patient want the referral with a specific physician? No    [facility] the [facility]    Patient Requesting a Response? Yes    Would the patient rather a call back or a response via [facility]? Call back    Best Call Back Number:[phone]

## 2022-02-21 ENCOUNTER — TELEPHONE (OUTPATIENT)
Dept: PAIN MEDICINE | Facility: CLINIC | Age: 47
End: 2022-02-21
Payer: COMMERCIAL

## 2022-02-21 NOTE — TELEPHONE ENCOUNTER
I got a message from Michelle that the patient wanted to reschedule her procedures.  I called the patient and she states she is not ready to schedule at this time.  She is waiting to here back from her .  She will call when ready to schedule.  All questions answered.

## 2022-03-14 ENCOUNTER — TELEPHONE (OUTPATIENT)
Dept: PAIN MEDICINE | Facility: CLINIC | Age: 47
End: 2022-03-14
Payer: COMMERCIAL

## 2022-03-14 ENCOUNTER — PATIENT MESSAGE (OUTPATIENT)
Dept: PSYCHIATRY | Facility: CLINIC | Age: 47
End: 2022-03-14
Payer: COMMERCIAL

## 2022-03-14 NOTE — TELEPHONE ENCOUNTER
----- Message from Brittni Live sent at 3/14/2022  3:22 PM CDT -----  Contact: pt  The pt request a return call, no additional info given and can be reached at [phone]///thxMW

## 2022-03-14 NOTE — TELEPHONE ENCOUNTER
I was on the phone with the patient earlier and her phone had bad service.  I called the patient back and advised that I was telling her that I needed to get her scheduled for a follow up with Dr. Andrade.  This has been scheduled for Monday, 5-11-22, at 11:00.  I also told her I would send a copy of the instructions to her through her [facility].  The patient verbalized understanding.  All questions answered.

## 2022-03-14 NOTE — TELEPHONE ENCOUNTER
----- Message from Es Hough sent at 3/14/2022  2:14 PM CDT -----  .Type:  Needs Medical Advice    Who Called: GABY SANCHEZ [[id number]]  Symptoms (please be specific):   How long has patient had these symptoms:    Pharmacy name and phone #:   Would the patient rather a call back or a response via [facility]?    Best Call Back Number:  [phone]  Additional Information: pt is requesting a call from office regarding her shoulder. Please call

## 2022-03-14 NOTE — TELEPHONE ENCOUNTER
I spoke to the patient and was able to get her scheduled for her procedures with Dr. Andrade.  I went over all the pre-procedure instructions with her and put a copy in the mail to her.  The patient verbalized understanding.  All questions answered.      I also made the patient an appointment with Dr. Andrade for a follow up after the injection.

## 2022-03-14 NOTE — TELEPHONE ENCOUNTER
The patient is NOT on any ASA or blood thinners.  No clearance is needed.  I went over the pre-procedure instructions with the patient and gave the patients a copy.  The patient verbalized understanding.  All questions answered.

## 2022-03-15 ENCOUNTER — TELEPHONE (OUTPATIENT)
Dept: INTERNAL MEDICINE | Facility: CLINIC | Age: 47
End: 2022-03-15
Payer: COMMERCIAL

## 2022-03-15 ENCOUNTER — TELEPHONE (OUTPATIENT)
Dept: PAIN MEDICINE | Facility: CLINIC | Age: 47
End: 2022-03-15
Payer: COMMERCIAL

## 2022-03-15 NOTE — TELEPHONE ENCOUNTER
----- Message from Jodi Rodriguez sent at 3/15/2022  8:57 AM CDT -----  Pt stated that she has a question about her last mammo. Please call [phone] ([phone])

## 2022-03-15 NOTE — TELEPHONE ENCOUNTER
I contacted the patient and explained to her that we need her Hgb A1C under 10.0 for Dr. Andrade to be able to do her procedure.  Explained that he last one was 11.1 on 10/22/22.  I asked who controls her blood sugar test and prescribes her medication.  She state it is Dr. Dobson.  I advised that she contact Dr. Dobson to see about getting a new test done.  Advised that for right now, her procedures on 3/21/22 and 4/11/22 along with her follow up on 5/9/22 will be cancelled until we get a reading of below 10.  The patient verbalized understanding.  All questions answered.

## 2022-04-01 ENCOUNTER — TELEPHONE (OUTPATIENT)
Dept: PRIMARY CARE CLINIC | Facility: CLINIC | Age: 47
End: 2022-04-01
Payer: COMMERCIAL

## 2022-04-01 NOTE — TELEPHONE ENCOUNTER
----- Message from Abdi De La Torre sent at 4/1/2022  8:49 AM CDT -----  Contact: PT  Calling to get Dr. Fuentes for the when she was out due to Covid. 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 Call back at [phone].

## 2022-04-01 NOTE — TELEPHONE ENCOUNTER
Patient call returned. She wanted to know if she could get her letters faxed to her from Dr. Ferrara over a year ago. Patient was advised that I would print her letters & fax them to her at fax number [phone]. Patient letters were faxed on today.

## 2022-04-20 ENCOUNTER — TELEPHONE (OUTPATIENT)
Dept: INTERNAL MEDICINE | Facility: CLINIC | Age: 47
End: 2022-04-20
Payer: COMMERCIAL

## 2022-04-20 ENCOUNTER — HOSPITAL ENCOUNTER (OUTPATIENT)
Dept: RADIOLOGY | Facility: HOSPITAL | Age: 47
Discharge: HOME | End: 2022-04-20
Attending: FAMILY MEDICINE
Payer: COMMERCIAL

## 2022-04-20 DIAGNOSIS — Z12.31 ENCOUNTER FOR SCREENING MAMMOGRAM FOR BREAST CANCER: ICD-10-CM

## 2022-04-20 PROCEDURE — 76642 ULTRASOUND BREAST LIMITED: CPT | Mod: 26,LT,, | Performed by: RADIOLOGY

## 2022-04-20 PROCEDURE — 76642 ULTRASOUND BREAST LIMITED: CPT | Mod: TC,LT

## 2022-04-20 PROCEDURE — 77066 DX MAMMO INCL CAD BI: CPT | Mod: 26,,, | Performed by: RADIOLOGY

## 2022-04-20 PROCEDURE — 77062 BREAST TOMOSYNTHESIS BI: CPT | Mod: TC

## 2022-04-20 PROCEDURE — 77062 BREAST TOMOSYNTHESIS BI: CPT | Mod: 26,,, | Performed by: RADIOLOGY

## 2022-04-20 PROCEDURE — 76642 US BREAST LEFT LIMITED: ICD-10-PCS | Mod: 26,LT,, | Performed by: RADIOLOGY

## 2022-04-20 PROCEDURE — 77062 MAMMO DIGITAL DIAGNOSTIC BILAT WITH TOMO: ICD-10-PCS | Mod: 26,,, | Performed by: RADIOLOGY

## 2022-04-20 PROCEDURE — 77066 MAMMO DIGITAL DIAGNOSTIC BILAT WITH TOMO: ICD-10-PCS | Mod: 26,,, | Performed by: RADIOLOGY

## 2022-04-20 NOTE — TELEPHONE ENCOUNTER
----- Message from Megan Maldonado sent at 4/20/2022  3:14 PM CDT -----  Contact: GABY SANCHEZ ENRIQUE [[id number]]@  [phone]  Calling to get test results.  Name of test (lab, x-ray): Mammo  Date of test: 4/20  Where was the test performed: [facility]  Would you like a call back, or a response through your [facility] portal?: Call Back  Comments:

## 2022-04-21 ENCOUNTER — TELEPHONE (OUTPATIENT)
Dept: RADIOLOGY | Facility: HOSPITAL | Age: 47
End: 2022-04-21
Payer: COMMERCIAL

## 2022-04-22 ENCOUNTER — OFFICE VISIT (OUTPATIENT)
Dept: SURGERY | Facility: CLINIC | Age: 47
End: 2022-04-22
Payer: COMMERCIAL

## 2022-04-22 ENCOUNTER — PATIENT MESSAGE (OUTPATIENT)
Dept: ADMINISTRATIVE | Facility: HOSPITAL | Age: 47
End: 2022-04-22
Payer: COMMERCIAL

## 2022-04-22 ENCOUNTER — PATIENT OUTREACH (OUTPATIENT)
Dept: ADMINISTRATIVE | Facility: HOSPITAL | Age: 47
End: 2022-04-22
Payer: COMMERCIAL

## 2022-04-22 VITALS
BODY MASS INDEX: 19.43 KG/M2 | HEART RATE: 92 BPM | DIASTOLIC BLOOD PRESSURE: 74 MMHG | SYSTOLIC BLOOD PRESSURE: 116 MMHG | WEIGHT: 120.38 LBS | TEMPERATURE: 97 F

## 2022-04-22 DIAGNOSIS — Z80.3 FAMILY HISTORY OF BREAST CANCER IN MOTHER: ICD-10-CM

## 2022-04-22 DIAGNOSIS — R92.8 ABNORMAL MAMMOGRAM OF LEFT BREAST: Primary | ICD-10-CM

## 2022-04-22 PROCEDURE — 99999 PR PBB SHADOW E&M-EST. PATIENT-LVL IV: CPT | Mod: PBBFAC,,, | Performed by: SURGERY

## 2022-04-22 PROCEDURE — 99999 PR PBB SHADOW E&M-EST. PATIENT-LVL IV: ICD-10-PCS | Mod: PBBFAC,,, | Performed by: SURGERY

## 2022-04-22 PROCEDURE — 99204 OFFICE O/P NEW MOD 45 MIN: CPT | Mod: S$GLB,,, | Performed by: SURGERY

## 2022-04-22 PROCEDURE — 99204 PR OFFICE/OUTPT VISIT, NEW, LEVL IV, 45-59 MIN: ICD-10-PCS | Mod: S$GLB,,, | Performed by: SURGERY

## 2022-04-22 NOTE — PROGRESS NOTES
[facility] -   General Surgery History & Physical    SUBJECTIVE:     History of Present Illness:  Patient is a 46 y.o. female presents with a left breast mass BiRADS4 on mammogram which she first noticed about 2 months ago. She states she has had normal mammograms in the past. She is concerned about unintentional weight loss as well.    Family history includes mother and maternal aunt with breast cancer.    Chief Complaint   Patient presents with    Breast Problem       Review of patient's allergies indicates:  No Known Allergies    Current Outpatient Medications   Medication Sig Dispense Refill    dextroamphetamine-amphetamine (ADDERALL) 20 mg tablet Take 1 tablet by mouth once daily. 30 tablet 0    glyBURIDE-metformin 5-500 mg (GLUCOVANCE) 5-500 mg Tab Take 2 tablets by mouth 2 (two) times daily with meals. 360 tablet 0    INVOKANA 300 mg Tab tablet Take 1 tablet (300 mg total) by mouth once daily. 30 tablet 0    liraglutide 0.6 mg/0.1 mL, 18 mg/3 mL, subq PNIJ (VICTOZA 2-MARIA ISABEL) 0.6 mg/0.1 mL (18 mg/3 mL) PnIj pen Inject 0.6 mg into the skin once daily. 9 mL 3    meloxicam (MOBIC) 15 MG tablet Take 1 tablet (15 mg total) by mouth once daily. 30 tablet 0    methocarbamoL (ROBAXIN) 500 MG Tab Take 1 tablet (500 mg total) by mouth 3 (three) times daily as needed (muscle spasms). 30 tablet 1    pantoprazole (PROTONIX) 40 MG tablet Take 1 tablet (40 mg total) by mouth once daily. 90 tablet 0    VITAMIN D2 1,250 mcg (50,000 unit) capsule Take 1 capsule (50,000 Units total) by mouth every 7 days. 12 capsule 2    clotrimazole-betamethasone 1-0.05% (LOTRISONE) cream Apply topically 2 (two) times daily. (Patient not taking: Reported on 4/22/2022) 45 g 0    famotidine (PEPCID) 40 MG tablet Take 40 mg by mouth daily as needed.       fluticasone propionate (FLONASE) 50 mcg/actuation nasal spray 1 spray (50 mcg total) by Each Nostril route once daily. (Patient not taking: Reported on 4/22/2022) 16 g 0     gabapentin (NEURONTIN) 300 MG capsule Take 1 capsule (300 mg total) by mouth every evening for 3 days, THEN 1 capsule (300 mg total) 2 (two) times daily for 3 days, THEN 1 capsule (300 mg total) 3 (three) times daily for 3 days, THEN 2 capsules (600 mg total) 2 (two) times daily for 3 days, THEN 2 capsules (600 mg total) 3 (three) times daily for 18 days. 138 capsule 0    ipratropium (ATROVENT) 21 mcg (0.03 %) nasal spray 2 sprays by Nasal route 2 (two) times daily. (Patient not taking: Reported on 2022) 30 mL 0     No current facility-administered medications for this visit.       Past Medical History:   Diagnosis Date    Carpal tunnel syndrome of left wrist 2015    EMG 6/4/15 Dr Rivera harmon L, negative R    Chest pain syndrome 2014    Diabetes mellitus 2014    DM (diabetes mellitus)      2021     Hyperlipidemia      Past Surgical History:   Procedure Laterality Date    BILATERAL TUBAL LIGATION      CARPAL TUNNEL RELEASE Left     COLONOSCOPY      ESOPHAGOGASTRODUODENOSCOPY      HYSTERECTOMY  2015    hys only; heavy cycles    LITHOTRIPSY       Family History   Problem Relation Age of Onset    Breast cancer Mother     Stroke Father     Hypertension Father     Breast cancer Maternal Aunt     Diabetes Maternal Aunt     Diabetes Brother     Diabetes Maternal Uncle     Diabetes Paternal Aunt     Hypertension Maternal Grandfather     Hypertension Paternal Grandmother     Diabetes Paternal Grandmother      Social History     Tobacco Use    Smoking status: Former Smoker     Packs/day: 1.00     Types: Cigars     Start date: 2014     Quit date: 10/16/2014     Years since quittin.5    Smokeless tobacco: Never Used   Substance Use Topics    Alcohol use: Yes     Alcohol/week: 0.0 standard drinks     Comment: occassionally    Drug use: No        Review of Systems:  Review of Systems   Constitutional: Positive for unexpected weight change.  Negative for fever.   HENT: Negative for trouble swallowing.    Respiratory: Negative for cough and shortness of breath.    Cardiovascular: Negative for chest pain.   Gastrointestinal: Negative for abdominal pain, blood in stool, constipation, diarrhea, nausea and vomiting.   Genitourinary: Negative for difficulty urinating, dysuria and hematuria.       OBJECTIVE:     Vital Signs (Most Recent)  Temp: 97.4 °F (36.3 °C) (04/22/22 1349)  Pulse: 92 (04/22/22 1349)  BP: 116/74 (04/22/22 1349)     54.6 kg (120 lb 5.9 oz)     Physical Exam:  Physical Exam  Vitals and nursing note reviewed.   Constitutional:       General: She is not in acute distress.     Appearance: She is not ill-appearing, toxic-appearing or diaphoretic.   HENT:      Head: Normocephalic and atraumatic.      Right Ear: External ear normal.      Left Ear: External ear normal.      Nose: Nose normal. No congestion or rhinorrhea.      Mouth/Throat:      Mouth: Mucous membranes are moist.      Pharynx: Oropharynx is clear.   Eyes:      General: No scleral icterus.        Right eye: No discharge.         Left eye: No discharge.      Extraocular Movements: Extraocular movements intact.      Conjunctiva/sclera: Conjunctivae normal.      Pupils: Pupils are equal, round, and reactive to light.   Cardiovascular:      Rate and Rhythm: Normal rate and regular rhythm.   Pulmonary:      Effort: Pulmonary effort is normal. No respiratory distress.      Breath sounds: No stridor.   Chest:   Breasts:      Right: No swelling, bleeding, inverted nipple, mass, nipple discharge, skin change, tenderness, axillary adenopathy or supraclavicular adenopathy.      Left: Mass present. No swelling, bleeding, inverted nipple, nipple discharge, skin change, tenderness, axillary adenopathy or supraclavicular adenopathy.       Abdominal:      General: There is no distension.      Palpations: Abdomen is soft.      Tenderness: There is no abdominal tenderness. There is no guarding or  rebound.   Musculoskeletal:         General: No deformity. Normal range of motion.      Cervical back: Normal range of motion and neck supple. No rigidity.   Lymphadenopathy:      Upper Body:      Right upper body: No supraclavicular or axillary adenopathy.      Left upper body: No supraclavicular or axillary adenopathy.   Skin:     General: Skin is warm and dry.      Capillary Refill: Capillary refill takes less than 2 seconds.      Coloration: Skin is not jaundiced.      Findings: No rash.   Neurological:      General: No focal deficit present.      Mental Status: She is alert and oriented to person, place, and time.      Cranial Nerves: No cranial nerve deficit.   Psychiatric:         Mood and Affect: Mood normal.         Behavior: Behavior normal.         Laboratory      Diagnostic Results:  Result:   Mammo Digital Diagnostic Bilat with Giovanni  US Breast Left Limited     History:  Patient is 46 y.o. and is seen for diagnostic imaging.     Films Compared:  Compared to: 02/02/2021 Mammo Digital Screening Bilat w/ Giovanni, 12/04/2019 Mammo Previous, and 11/25/2019 Mammo Previous     Findings:  This procedure was performed using tomosynthesis. Computer-aided detection was utilized in the interpretation of this examination.  The breasts are heterogeneously dense, which may obscure small masses.      Left  Mammo Digital Diagnostic Bilat with Giovanni  There is a mass seen in the upper outer quadrant of the left breast.      US Breast Left Limited  There is an 11 mm x 10 mm x 11 mm round, hypoechoic mass with microlobulated margins seen in the left breast at 1 o'clock. Two additional hypoechoic solid masses are also seen at the 1 o'clock position of the left breast that measure 11 x 8 x 12 mm and 12 x 8 x 8 mm. These masses are smoothly marginated and favored to represent fibroadenoma. Biopsy of the palpable  rounded lesion with microlobulated margins is recommended. Based on the pathology results of the microlobulated lesion  the other lesions can be followed or biopsied as well.      Right  Mammo Digital Diagnostic Bilat with Giovanni  There is no evidence of suspicious masses, calcifications, or other abnormal findings in the right breast.     Impression:  Left  Mass: Left breast 11 mm x 10 mm x 11 mm mass at the 1 o'clock position. Assessment: 4 - Suspicious finding. Biopsy is recommended.      Right  There is no mammographic or sonographic evidence of malignancy in the right breast.     BI-RADS Category:   Overall: 4 - Suspicious     Recommendation:  Biopsy is recommended.    ASSESSMENT/PLAN:     Gonzalo was seen today for breast problem.    Diagnoses and all orders for this visit:    Abnormal mammogram of left breast  -     US Breast Biopsy with Imaging 1st site Left; Future  -     US Breast Biopsy with Imaging Ea Add; Future  -     US Breast Biopsy with Imaging Ea Add; Future    Family history of breast cancer in mother         Will send for core needle biopsies. Patient would also like the other two, benign appearing left breast masses biopsied as well due to her strong family history.    Patient expressed understanding and is in agreement.      Marisela Roberts, DO  General Surgery  [facility]  4/22/2022    I spent a total of 45 minutes on the day of the visit.This includes face to face time and non-face to face time preparing to see the patient (eg, review of tests), obtaining and/or reviewing separately obtained history, documenting clinical information in the electronic or other health record, independently interpreting results and communicating results to the patient/family/caregiver, or care coordinator.

## 2022-04-22 NOTE — PROGRESS NOTES
Working Dm Report:     Pt overdue for DM labs. Has scheduled annual with PCP on June 6th. Message pt to see if she would like to come in prior to appt for pre visit labs since her appt is at 4pm with PCP.

## 2022-04-26 ENCOUNTER — PATIENT MESSAGE (OUTPATIENT)
Dept: ADMINISTRATIVE | Facility: HOSPITAL | Age: 47
End: 2022-04-26
Payer: COMMERCIAL

## 2022-04-26 ENCOUNTER — OFFICE VISIT (OUTPATIENT)
Dept: PSYCHIATRY | Facility: CLINIC | Age: 47
End: 2022-04-26
Payer: COMMERCIAL

## 2022-04-26 DIAGNOSIS — F41.9 ANXIETY: Primary | ICD-10-CM

## 2022-04-26 DIAGNOSIS — F33.1 MODERATE EPISODE OF RECURRENT MAJOR DEPRESSIVE DISORDER: ICD-10-CM

## 2022-04-26 PROCEDURE — 90791 PR PSYCHIATRIC DIAGNOSTIC EVALUATION: ICD-10-PCS | Mod: 95,,, | Performed by: SOCIAL WORKER

## 2022-04-26 PROCEDURE — 90791 PSYCH DIAGNOSTIC EVALUATION: CPT | Mod: 95,,, | Performed by: SOCIAL WORKER
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
## 2022-05-05 ENCOUNTER — HOSPITAL ENCOUNTER (OUTPATIENT)
Dept: RADIOLOGY | Facility: HOSPITAL | Age: 47
Discharge: HOME | End: 2022-05-05
Attending: SURGERY
Payer: COMMERCIAL

## 2022-05-05 DIAGNOSIS — R92.8 ABNORMAL MAMMOGRAM OF LEFT BREAST: ICD-10-CM

## 2022-05-05 DIAGNOSIS — R92.8 ABNORMAL MAMMOGRAM: ICD-10-CM

## 2022-05-05 PROCEDURE — 88341 IMHCHEM/IMCYTCHM EA ADD ANTB: CPT | Mod: 26,59,, | Performed by: PATHOLOGY

## 2022-05-05 PROCEDURE — 19083 US BREAST BIOPSY WITH IMAGING 1ST SITE LEFT: ICD-10-PCS | Mod: LT,,, | Performed by: RADIOLOGY

## 2022-05-05 PROCEDURE — 88342 IMHCHEM/IMCYTCHM 1ST ANTB: CPT | Mod: 26,59,, | Performed by: PATHOLOGY

## 2022-05-05 PROCEDURE — 88360 PR  TUMOR IMMUNOHISTOCHEM/MANUAL: ICD-10-PCS | Mod: 26,,, | Performed by: PATHOLOGY

## 2022-05-05 PROCEDURE — 19083 BX BREAST 1ST LESION US IMAG: CPT | Mod: LT

## 2022-05-05 PROCEDURE — 77065 DX MAMMO INCL CAD UNI: CPT | Mod: 26,LT,, | Performed by: RADIOLOGY

## 2022-05-05 PROCEDURE — 77065 MAMMO DIGITAL DIAGNOSTIC LEFT: ICD-10-PCS | Mod: 26,LT,, | Performed by: RADIOLOGY

## 2022-05-05 PROCEDURE — 19084 PR BX BRST, EA ADD'L LESION, US GUIDANCE: ICD-10-PCS | Mod: LT,,, | Performed by: RADIOLOGY

## 2022-05-05 PROCEDURE — 27200934 US BREAST BIOPSY WITH IMAGING EA ADDITIONAL

## 2022-05-05 PROCEDURE — 88305 TISSUE EXAM BY PATHOLOGIST: CPT | Performed by: PATHOLOGY

## 2022-05-05 PROCEDURE — 77065 DX MAMMO INCL CAD UNI: CPT | Mod: TC,LT

## 2022-05-05 PROCEDURE — 88341 IMHCHEM/IMCYTCHM EA ADD ANTB: CPT | Mod: 59 | Performed by: PATHOLOGY

## 2022-05-05 PROCEDURE — 88305 TISSUE EXAM BY PATHOLOGIST: ICD-10-PCS | Mod: 26,,, | Performed by: PATHOLOGY

## 2022-05-05 PROCEDURE — 88360 TUMOR IMMUNOHISTOCHEM/MANUAL: CPT | Performed by: PATHOLOGY

## 2022-05-05 PROCEDURE — A4648 IMPLANTABLE TISSUE MARKER: HCPCS

## 2022-05-05 PROCEDURE — 88305 TISSUE EXAM BY PATHOLOGIST: CPT | Mod: 26,,, | Performed by: PATHOLOGY

## 2022-05-05 PROCEDURE — 88360 TUMOR IMMUNOHISTOCHEM/MANUAL: CPT | Mod: 26,,, | Performed by: PATHOLOGY

## 2022-05-05 PROCEDURE — 88341 PR IHC OR ICC EACH ADD'L SINGLE ANTIBODY  STAINPR: ICD-10-PCS | Mod: 26,59,, | Performed by: PATHOLOGY

## 2022-05-05 PROCEDURE — 19084 BX BREAST ADD LESION US IMAG: CPT | Mod: LT,,, | Performed by: RADIOLOGY

## 2022-05-05 PROCEDURE — 88342 IMHCHEM/IMCYTCHM 1ST ANTB: CPT | Mod: 59 | Performed by: PATHOLOGY

## 2022-05-05 PROCEDURE — 88342 CHG IMMUNOCYTOCHEMISTRY: ICD-10-PCS | Mod: 26,59,, | Performed by: PATHOLOGY

## 2022-05-05 PROCEDURE — 19083 BX BREAST 1ST LESION US IMAG: CPT | Mod: LT,,, | Performed by: RADIOLOGY

## 2022-05-05 PROCEDURE — 27200937 US BREAST BIOPSY WITH IMAGING EA ADDITIONAL

## 2022-05-05 NOTE — NURSING
Pressure held on left breast biopsy site x3 for 10 mins, hemostasis was achieved, steri strips were applied, and wound was covered with 4x4 guaze and a tegaderm.  Dressing clean, dry and intact with no drainage noted.  Discharge instructions given verbally and in writing, patient voiced understandings.  Patient discharged and accompanied by family member.

## 2022-05-11 ENCOUNTER — TELEPHONE (OUTPATIENT)
Dept: HEMATOLOGY/ONCOLOGY | Facility: CLINIC | Age: 47
End: 2022-05-11
Payer: COMMERCIAL

## 2022-05-11 ENCOUNTER — TELEPHONE (OUTPATIENT)
Dept: SURGERY | Facility: HOSPITAL | Age: 47
End: 2022-05-11
Payer: COMMERCIAL

## 2022-05-11 DIAGNOSIS — C50.912 INVASIVE DUCTAL CARCINOMA OF BREAST, FEMALE, LEFT: Primary | ICD-10-CM

## 2022-05-11 LAB
FINAL PATHOLOGIC DIAGNOSIS: NORMAL
GROSS: NORMAL
Lab: NORMAL
MICROSCOPIC EXAM: NORMAL

## 2022-05-11 NOTE — NURSING
1556pm: Outgoing call to pt regarding multi-D breast referral per Dr. Roberts. Spoke with pt. Introduced myself and role to pt. Gave pt my direct contact info. Explained multi-D breast process and appts needed. Pt scheduled for multi-D breast on 5/16 at 1230p virtually, for HemOnc on 5/18 at 1120am at [place] with Dr. Guardado, for RadOnc on 5/20 at 830am at  with Dr. Falk, and for Surg on 5/24 at 9am at [facility] with Dr. Roberts. Pt given location instructions. Pt encouraged to contact me directly if have any further questions and/or concerns. Pt verbalized understanding.   Oncology Navigation   Intake  Date of Diagnosis: 5/5/2022  Cancer Type: Breast  Internal / External Referral: Internal  Referral Source: MILLER Roberts  Date of Referral: 5/11/2022  Initial Nurse Navigator Contact: 5/11/2022  Referral to Initial Contact Timeline (days): 0  Date Worked: 5/11/2022  First Appointment Available: 5/18/2022  Appointment Date: 5/18/2022  Schedule to Appointment Timeline (days): 7  First Available Date vs. Scheduled Date (days): 0  Multiple appointments: Yes     Treatment                              Acuity      Follow Up  No follow-ups on file.

## 2022-05-11 NOTE — TELEPHONE ENCOUNTER
Discussed pathology results showing invasive ductal carcinoma with patient. Will refer to oncology and radiation oncology and discuss at tumor board. She will follow up with me to discuss surgical options after tumor board recommendations. Patient expressed understanding and is in agreement.

## 2022-05-16 ENCOUNTER — TUMOR BOARD CONFERENCE (OUTPATIENT)
Dept: HEMATOLOGY/ONCOLOGY | Facility: CLINIC | Age: 47
End: 2022-05-16
Payer: COMMERCIAL

## 2022-05-16 DIAGNOSIS — C50.919 TRIPLE NEGATIVE MALIGNANT NEOPLASM OF BREAST: Primary | ICD-10-CM

## 2022-05-16 NOTE — PROGRESS NOTES
Tumor Board Documentation      Gonzalo Cordova was presented by Marisela Roberts DO at our Tumor Board on 5/16/2022, which included representatives from (P) Medical Oncology, Navigation, Hematology, Radiation Oncology, Surgical Oncology, Genetics.    Gonzalo currently presents as (P) a current patient with (P) Breast cancer (IDC involving intermediate DCIS), with history of the following treatments: (P) None.    Additionally, we reviewed previous medical and familial history, history of present illness, and recent lab results along with all available histopathologic and imaging studies. The tumor board considered available treatment options and made the following recommendations:     (P) Chemotherapy, Surgery, Imaging, Genetic Testing    Oncology: recommends to obtain germline testing due to strong family history of breast cancer, obtain PET scan, and proceed with neoadjuvant chemotherapy/immunotherapy pending germline and PET scan results. Surgery: recommends mastectomy; If pt truly desires breast conservation, then will have joint prcedure with plastics pending germline testing results. RadOnc: If mastectomy, then no radiation.    The following procedures/referrals were also placed: No orders of the defined types were placed in this encounter.      Clinical Trial Status: (P) Not discussed     National site-specific guidelines were discussed with respect to the case.    Tumor board is a meeting of clinicians from various specialty areas who evaluate and discuss patients for whom a multidisciplinary approach is being considered. Final determinations in the plan of care are those of the provider(s). The responsibility for follow up of recommendations given during tumor board is that of the provider.     Marisela Roberts DO

## 2022-05-17 ENCOUNTER — TELEPHONE (OUTPATIENT)
Dept: HEMATOLOGY/ONCOLOGY | Facility: CLINIC | Age: 47
End: 2022-05-17
Payer: COMMERCIAL

## 2022-05-17 NOTE — NURSING
1005am: Outgoing call to pt regarding new appt scheduled. Pt didn't answer. No VM available. Pt scheduled for PET scan on 5/26 at 9 am at . Will try pt again later today.   Oncology Navigation   Intake  Date of Diagnosis: 5/5/2022  Cancer Type: Breast  Internal / External Referral: Internal  Referral Source: MILLER Roberts  Date of Referral: 5/11/2022  Initial Nurse Navigator Contact: 5/11/2022  Referral to Initial Contact Timeline (days): 0  Date Worked: 5/17/2022  First Appointment Available: 5/18/2022  Appointment Date: 5/18/2022  Schedule to Appointment Timeline (days): 7  First Available Date vs. Scheduled Date (days): 0  Multiple appointments: Yes     Treatment  Date Presented to Tumor Board: 5/16/2022             Procedures: PET scan  PET Scan Schedule Date: 5/26/2022       ER: Negative  DE: Negative  Her2: Negative           Acuity      Follow Up  No follow-ups on file.

## 2022-05-17 NOTE — NURSING
1131am: Incoming call from pt regarding missed call from me earlier. Spoke with pt. Pt informed me that she will no longer be receiving breast cancer care at [facility] but is seeking care at [facility]. All pt appts cancelled. Dr. Guardado, Dr. Falk, and Dr. Roberts informed via in-basket msg. Pt verbalized understanding.   Oncology Navigation   Intake  Date of Diagnosis: 5/5/2022  Cancer Type: Breast  Internal / External Referral: Internal  Referral Source: MILLER Roberts  Date of Referral: 5/11/2022  Initial Nurse Navigator Contact: 5/11/2022  Referral to Initial Contact Timeline (days): 0  Date Worked: 5/17/2022  First Appointment Available: 5/18/2022  Appointment Date: 5/18/2022  Schedule to Appointment Timeline (days): 7  First Available Date vs. Scheduled Date (days): 0  Multiple appointments: Yes     Treatment  Date Presented to Tumor Board: 5/16/2022       Medical Oncologist: Dr. Garrett Guardado  Consult Date: 5/18/2022    Radiation Oncologist: Dr. Roman Falk    Procedures: PET scan  PET Scan Schedule Date: 5/26/2022       ER: Negative  OK: Negative  Her2: Negative    Radiation Oncologist: Dr. Roman Falk        Acuity      Follow Up  No follow-ups on file.

## 2022-06-01 RX ORDER — GLYBURIDE-METFORMIN HYDROCHLORIDE 5; 500 MG/1; MG/1
2 TABLET ORAL 2 TIMES DAILY WITH MEALS
Qty: 360 TABLET | Refills: 0 | Status: SHIPPED | OUTPATIENT
Start: 2022-06-01

## 2022-06-01 RX ORDER — CANAGLIFLOZIN 300 MG/1
300 TABLET, FILM COATED ORAL DAILY
Qty: 30 TABLET | Refills: 0 | Status: SHIPPED | OUTPATIENT
Start: 2022-06-01

## 2022-06-01 NOTE — TELEPHONE ENCOUNTER
Care Due:                  Date            Visit Type   Department     Provider  --------------------------------------------------------------------------------                                SAME DAY -                              ESTABLISHED   [facility] INTER[facility]  Last Visit: 10-      PATIENT      MEDICINE       [facility]  [place]                              EP -                              PRIMARY      [facility] INTER[facility]  Next Visit: 06-      CARE ([facility])   MEDICINE       [facility]                                                            Last  Test          Frequency    Reason                     Performed    Due Date  --------------------------------------------------------------------------------    HBA1C.......  6 months...  INVOKANA, liraglutide....  10-   04-    [facility] Embedded Care Gaps. Reference number: [id number]. 6/01/2022   3:48:30 PM CDT

## 2022-06-01 NOTE — TELEPHONE ENCOUNTER
----- Message from Brittni Live sent at 6/1/2022  2:30 PM CDT -----  Contact: pt  The pt request a return call, no additional info given and can be reached at [phone]///[id number]

## 2022-06-03 ENCOUNTER — PATIENT MESSAGE (OUTPATIENT)
Dept: ADMINISTRATIVE | Facility: HOSPITAL | Age: 47
End: 2022-06-03
Payer: COMMERCIAL

## 2022-07-06 ENCOUNTER — PATIENT OUTREACH (OUTPATIENT)
Dept: ADMINISTRATIVE | Facility: HOSPITAL | Age: 47
End: 2022-07-06
Payer: COMMERCIAL

## 2022-07-06 NOTE — PROGRESS NOTES
DM REPORT: I attempted to contact pt to schedule labs and f/u with pcp. Unable to reach patient at this time. Left voicemail.

## 2022-07-26 ENCOUNTER — TELEPHONE (OUTPATIENT)
Dept: SPORTS MEDICINE | Facility: CLINIC | Age: 47
End: 2022-07-26
Payer: COMMERCIAL

## 2022-07-26 DIAGNOSIS — M54.12 CERVICAL RADICULOPATHY: ICD-10-CM

## 2022-07-26 DIAGNOSIS — M79.18 MYOFASCIAL PAIN: ICD-10-CM

## 2022-07-26 DIAGNOSIS — G56.03 BILATERAL CARPAL TUNNEL SYNDROME: ICD-10-CM

## 2022-07-26 NOTE — TELEPHONE ENCOUNTER
----- Message from Lorri Quintero sent at 7/26/2022  3:39 PM CDT -----  Contact: [place], [phone]  Requesting an RX refill or new RX.  Is this a refill or new RX: Refill  RX name and strength (copy/paste from chart):  gabapentin (NEURONTIN) 300 MG capsule  Is this a 30 day or 90 day RX:   Pharmacy name and phone # (copy/paste from chart):     [phone] [facility], [address]  Phone [phone]  The doctors have asked that we provide their patients with the following 2 reminders -- prescription refills can take up to 72 hours, and a friendly reminder that in the future you can use your [facility] account to request refills: Yes         Requesting an RX refill or new RX.  Is this a refill or new RX: Refill  RX name and strength (copy/paste from chart):  meloxicam (MOBIC) 15 MG tablet  Is this a 30 day or 90 day RX:   Pharmacy name and phone # (copy/paste from chart):     [phone] [facility], [address]  Phone [phone]  The doctors have asked that we provide their patients with the following 2 reminders -- prescription refills can take up to 72 hours, and a friendly reminder that in the future you can use your [facility] account to request refills: Yes

## 2022-07-26 NOTE — TELEPHONE ENCOUNTER
Spoke with patient about medication refills. Informed patient that our providers will not refill these medications without evaluating her in our clinic. I offered to schedule an appt if she was experiencing an ortho issue. Pt was also notified that per Dr. Lima's notes, she would need to contact pain management for a refill. Patient is established with Dr. Andrade in pain management. Pt declined to schedule with one of our ortho providers, so I told the patient she would need to contact pain management for further assistance in refilling these medications. Pt verbalized understanding.    ----- Message from Flavio Swartz sent at 7/26/2022  2:45 PM CDT -----  Contact: Gonzalo  Pt is requesting a call to see who will be taking over meds refill since Mrs. Huang is in a different department. Pt needs refill on her gabapentin and meloxicam and can be [facility] to [facility] [place]. Please call her back at  [phone].            Thanks  DD

## 2022-07-26 NOTE — TELEPHONE ENCOUNTER
gabapentin (NEURONTIN) 300 MG capsule          LOV: 12/22/2021 Dr. Raymond Dentont Apt:8/15/2022 Sepideh Houston PA-C   Last Refill: 1/27/2022  Gabapentin   Last Refill: 12/3/2022 [facility]  Pharmacy: [phone] [place] [address]     Please Advise     Made appointment due to pt not being seen in over 6 months. Pt understood.

## 2022-07-27 RX ORDER — GABAPENTIN 300 MG/1
CAPSULE ORAL
Qty: 138 CAPSULE | Refills: 0 | Status: SHIPPED | OUTPATIENT
Start: 2022-07-27 | End: 2022-08-26

## 2022-07-27 RX ORDER — MELOXICAM 15 MG/1
15 TABLET ORAL DAILY
Qty: 30 TABLET | Refills: 0 | Status: SHIPPED | OUTPATIENT
Start: 2022-07-27

## 2022-09-01 LAB
ALBUMIN: 4.2
ALP SERPL-CCNC: 70 U/L (ref 25–125)
ALT SERPL W P-5'-P-CCNC: 17 U/L (ref 7–35)
ANION GAP SERPL CALC-SCNC: 13 MMOL/L (ref ?–30)
AST SERPL-CCNC: 12 U/L (ref 13–35)
BILIRUB SERPL-MCNC: 0.3 MG/DL (ref 0.1–1.4)
BUN SERPL-MCNC: 8 MG/DL
CALCIUM SERPL-MCNC: 10.3 MG/DL (ref 8.7–10.7)
CHLORIDE SERPL-SCNC: 103 MMOL/L (ref 99–108)
CO2 SERPL-SCNC: 26 MMOL/L (ref 13–22)
CREAT SERPL-MCNC: 0.7 MG/DL (ref 0.5–1.1)
EGFR: 111
GLUCOSE SERPL-MCNC: 214 MG/DL
POTASSIUM SERPL-SCNC: 3.5 MMOL/L (ref 3.4–5.3)
PROT SNV-MCNC: 7.5 G/L
SODIUM BLD-SCNC: 142 MMOL/L (ref 137–147)

## 2022-09-02 ENCOUNTER — PATIENT MESSAGE (OUTPATIENT)
Dept: ADMINISTRATIVE | Facility: HOSPITAL | Age: 47
End: 2022-09-02
Payer: COMMERCIAL

## 2022-09-07 ENCOUNTER — PATIENT OUTREACH (OUTPATIENT)
Dept: ADMINISTRATIVE | Facility: HOSPITAL | Age: 47
End: 2022-09-07
Payer: COMMERCIAL

## 2022-09-19 ENCOUNTER — PATIENT OUTREACH (OUTPATIENT)
Dept: ADMINISTRATIVE | Facility: HOSPITAL | Age: 47
End: 2022-09-19
Payer: COMMERCIAL

## 2023-05-29 ENCOUNTER — TELEPHONE (OUTPATIENT)
Dept: SURGERY | Facility: CLINIC | Age: 48
End: 2023-05-29

## 2023-06-12 ENCOUNTER — OFFICE VISIT (OUTPATIENT)
Dept: SURGERY | Facility: CLINIC | Age: 48
End: 2023-06-12
Payer: COMMERCIAL

## 2023-06-12 VITALS — WEIGHT: 120 LBS | HEIGHT: 66 IN | BODY MASS INDEX: 19.29 KG/M2

## 2023-06-12 DIAGNOSIS — C50.412 MALIGNANT NEOPLASM OF UPPER-OUTER QUADRANT OF LEFT BREAST IN FEMALE, ESTROGEN RECEPTOR NEGATIVE: Primary | ICD-10-CM

## 2023-06-12 DIAGNOSIS — Z17.1 MALIGNANT NEOPLASM OF UPPER-OUTER QUADRANT OF LEFT BREAST IN FEMALE, ESTROGEN RECEPTOR NEGATIVE: Primary | ICD-10-CM

## 2023-06-12 DIAGNOSIS — Z15.01 GENETIC SUSCEPTIBILITY TO BREAST CANCER: ICD-10-CM

## 2023-06-12 PROCEDURE — 99214 PR OFFICE/OUTPT VISIT, EST, LEVL IV, 30-39 MIN: ICD-10-PCS | Mod: S$GLB,,, | Performed by: SPECIALIST

## 2023-06-12 PROCEDURE — 99214 OFFICE O/P EST MOD 30 MIN: CPT | Mod: S$GLB,,, | Performed by: SPECIALIST

## 2023-06-23 NOTE — PROGRESS NOTES
[facility]  Christiano Agee MD, FACS  Brandy Gtz NP-C      Chief Complaint:   Gonzalo Cordova is a 47 y.o. female presenting today requesting port removal in the office. She reports no interval changes.     History of Present Illness:   Mrs. Cordova was diagnosed with multifocal Triple Negative left breast cancer in May 2022 at the age of 46.  One SLN was positive for isolated tumor cells.  She completed alexei-adjvuant chemotherapy. She elected bilateral mastectomies that showed NEM on the right.  Left pathology showed a residual 8 and 9 mm IDC with negative margins (albeit 1 mm posteriorly).  PALB 2 Positive (2022).   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 (Patient not taking: Reported on 4/22/2022), Disp: 45 g, Rfl: 0    dextroamphetamine-amphetamine (ADDERALL) 20 mg tablet, Take 1 tablet by mouth once daily., Disp: 30 tablet, Rfl: 0    famotidine (PEPCID) 40 MG tablet, Take 40 mg by mouth daily as needed. , Disp: , Rfl:     fluticasone propionate (FLONASE) 50 mcg/actuation nasal spray, 1 spray (50 mcg total) by  Each Nostril route once daily. (Patient not taking: Reported on 2022), Disp: 16 g, Rfl: 0    gabapentin (NEURONTIN) 300 MG capsule, Take 1 capsule (300 mg total) by mouth every evening for 3 days, THEN 1 capsule (300 mg total) 2 (two) times daily for 3 days, THEN 1 capsule (300 mg total) 3 (three) times daily for 3 days, THEN 2 capsules (600 mg total) 2 (two) times daily for 3 days, THEN 2 capsules (600 mg total) 3 (three) times daily for 18 days., Disp: 138 capsule, Rfl: 0    glyBURIDE-metformin 5-500 mg (GLUCOVANCE) 5-500 mg Tab, Take 2 tablets by mouth 2 (two) times daily with meals., Disp: 360 tablet, Rfl: 0    INVOKANA 300 mg Tab tablet, Take 1 tablet (300 mg total) by mouth once daily., Disp: 30 tablet, Rfl: 0    ipratropium (ATROVENT) 21 mcg (0.03 %) nasal spray, 2 sprays by Nasal route 2 (two) times daily. (Patient not taking: Reported on 2022), Disp: 30 mL, Rfl: 0    liraglutide 0.6 mg/0.1 mL, 18 mg/3 mL, subq PNIJ (VICTOZA 2-MARIA ISABEL) 0.6 mg/0.1 mL (18 mg/3 mL) PnIj pen, Inject 0.6 mg into the skin once daily., Disp: 9 mL, Rfl: 3    meloxicam (MOBIC) 15 MG tablet, Take 1 tablet (15 mg total) by mouth once daily., Disp: 30 tablet, Rfl: 0    methocarbamoL (ROBAXIN) 500 MG Tab, Take 1 tablet (500 mg total) by mouth 3 (three) times daily as needed (muscle spasms)., Disp: 30 tablet, Rfl: 1    pantoprazole (PROTONIX) 40 MG tablet, Take 1 tablet (40 mg total) by mouth once daily., Disp: 90 tablet, Rfl: 0    VITAMIN D2 1,250 mcg (50,000 unit) capsule, Take 1 capsule (50,000 Units total) by mouth every 7 days., Disp: 12 capsule, Rfl: 2   Review of patient's allergies indicates:  No Known Allergies   Social History     Tobacco Use    Smoking status: Former     Packs/day: 1.00     Types: Cigars, Cigarettes     Start date: 2014     Quit date: 10/16/2014     Years since quittin.7    Smokeless tobacco: Never   Substance Use Topics    Alcohol use: Yes     Alcohol/week: 0.0 standard drinks     Comment:  occassionally      Family History   Problem Relation Age of Onset    Breast cancer Mother     Stroke Father     Hypertension Father     Breast cancer Maternal Aunt     Diabetes Maternal Aunt     Diabetes Brother     Diabetes Maternal Uncle     Diabetes Paternal Aunt     Hypertension Maternal Grandfather     Hypertension Paternal Grandmother     Diabetes Paternal Grandmother         Review of Systems   Integumentary:  Negative for color change, rash, mole/lesion, breast mass, breast discharge and breast tenderness.   Breast: Negative for mass and tenderness     Physical Exam   Constitutional: She is cooperative.   HENT:   Head: Normocephalic.   Pulmonary/Chest: She exhibits no mass. Right breast exhibits no mass, no skin change and no tenderness. Left breast exhibits no mass, no skin change and no tenderness. No breast swelling.   Bilateral breasts are surgically absent and are without signs of malignancy/recurrence.   Abdominal: Normal appearance.   Genitourinary: No breast swelling.   Musculoskeletal: Lymphadenopathy:      Upper Body:      Right upper body: No supraclavicular or axillary adenopathy.      Left upper body: No supraclavicular or axillary adenopathy.     Neurological: She is alert.   Skin: No rash noted.      PORT REMOVAL PROCEDURE NOTE:The patient was placed on the exam table and informed consent (verbal/written) was obtained.  A time out was performed that involved all present and the patient. The procedure area underwent Betadine prepping and the old incision was infiltrated with Lidocaine. Approximately 8 cc was used. A #15 blade scalpel was used to excise the old scar. The port was identified and dissected free from all surrounding structures with the #15 blade. The port was removed and noted to grossly be completely intact. A 4-0 Vicryl suture was placed through the catheter exit site and pressure was held for 5 minutes. No oozing was noted. 4-0 Vicryl sutures were placed in the dermal tissues  followed by a 4-0 PDS in a subcuticular fashion. Steri strips were applied. Pressure was held with no oozing or swelling noted.  She was given specific post-procedure instructions and told to call me with any concerns or questions.    ASSESSMENT and PLAN of CARE    1. Malignant neoplasm of upper-outer quadrant of left breast in female, estrogen receptor negative  Assessment & Plan:   Patient is doing well and is being followed according to NCCN Guidelines. She understands that her exams have remained stable and is comfortable being followed in a conservative fashion. She understands the importance of monthly self-breast examination and knows to report any and all changes as they occur.          Medical Decision Making:  It is my impression that the patient suffers from all the listed conditions.  Each of these conditions did affect my Plan of Care and all medical decisions that were rendered.  The medical decision making was of high difficulty based on her comorbidities and her previous diagnosis of Triple Negative breast cancer, which can lead to an increased recurrence rate and pose a direct threat to her life.  Laboratory evaluations are currently pending but will be reviewed in the next 24 hours. Any further recommendations will be communicated to the patient by me.  I have reviewed and verified her allergies, list of medications, medical and surgical histories, social history, and a pertinent review of symptoms.    Follow up:  keep August 2023 appt     For:PE and CXR

## 2023-07-03 ENCOUNTER — OFFICE VISIT (OUTPATIENT)
Dept: SURGERY | Facility: CLINIC | Age: 48
End: 2023-07-03
Payer: COMMERCIAL

## 2023-07-03 DIAGNOSIS — C50.412 MALIGNANT NEOPLASM OF UPPER-OUTER QUADRANT OF LEFT BREAST IN FEMALE, ESTROGEN RECEPTOR NEGATIVE: ICD-10-CM

## 2023-07-03 DIAGNOSIS — Z17.1 MALIGNANT NEOPLASM OF UPPER-OUTER QUADRANT OF LEFT BREAST IN FEMALE, ESTROGEN RECEPTOR NEGATIVE: ICD-10-CM

## 2023-07-03 PROCEDURE — 99213 OFFICE O/P EST LOW 20 MIN: CPT | Mod: S$GLB,,, | Performed by: SPECIALIST

## 2023-07-03 PROCEDURE — 99213 PR OFFICE/OUTPT VISIT, EST, LEVL III, 20-29 MIN: ICD-10-PCS | Mod: S$GLB,,, | Performed by: SPECIALIST

## 2023-08-04 DIAGNOSIS — C50.412 MALIGNANT NEOPLASM OF UPPER-OUTER QUADRANT OF LEFT BREAST IN FEMALE, ESTROGEN RECEPTOR NEGATIVE: Primary | ICD-10-CM

## 2023-08-04 DIAGNOSIS — Z17.1 MALIGNANT NEOPLASM OF UPPER-OUTER QUADRANT OF LEFT BREAST IN FEMALE, ESTROGEN RECEPTOR NEGATIVE: Primary | ICD-10-CM

## 2023-08-14 ENCOUNTER — TELEPHONE (OUTPATIENT)
Dept: SURGERY | Facility: CLINIC | Age: 48
End: 2023-08-14

## 2023-09-01 DIAGNOSIS — Z17.1 MALIGNANT NEOPLASM OF UPPER-OUTER QUADRANT OF LEFT BREAST IN FEMALE, ESTROGEN RECEPTOR NEGATIVE: Primary | ICD-10-CM

## 2023-09-01 DIAGNOSIS — C50.412 MALIGNANT NEOPLASM OF UPPER-OUTER QUADRANT OF LEFT BREAST IN FEMALE, ESTROGEN RECEPTOR NEGATIVE: Primary | ICD-10-CM

## 2023-09-01 NOTE — PROGRESS NOTES
[facility]  Chritsiano Agee MD, FACS  SANDRA Matute      Date of Service: 09/13/2023    Chief Complaint:   Gonzalo Cordova is a 47 y.o. female presenting today for her 3 month follow up due to her breast cancer diagnosis.  She is due for her CXR.  She reports no interval changes on her self-breast examination.     History of Present Illness:   Mrs. Cordova was diagnosed with multifocal Triple Negative left breast cancer in May 2022 at the age of 46.  One SLN was positive for isolated tumor cells.  She completed alexei-adjvuant chemotherapy. She elected bilateral mastectomies that showed NEM on the right.  Left pathology showed a residual 8 and 9 mm IDC with negative margins (albeit 1 mm posteriorly).  PALB 2 Positive (2022).   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     Hyperlipidemia     Malignant neoplasm of upper-outer quadrant of left breast in female, estrogen receptor negative       Past Surgical History:   Procedure Laterality Date    BILATERAL MASTECTOMY  12/20/2022    BILATERAL TUBAL LIGATION  2000    CARPAL TUNNEL RELEASE Left 2014    COLONOSCOPY      ESOPHAGOGASTRODUODENOSCOPY      HYSTERECTOMY  2015    hys only; heavy cycles    LITHOTRIPSY          Current Outpatient Medications:     clotrimazole-betamethasone 1-0.05% (LOTRISONE) cream, Apply topically 2 (two) times daily., Disp: 45 g, Rfl: 0    dextroamphetamine-amphetamine (ADDERALL) 20 mg tablet, Take 1 tablet by mouth once daily., Disp: 30 tablet, Rfl: 0    famotidine (PEPCID) 40 MG tablet, Take 40 mg by mouth daily as needed. , Disp: , Rfl:     fluticasone  propionate (FLONASE) 50 mcg/actuation nasal spray, 1 spray (50 mcg total) by Each Nostril route once daily., Disp: 16 g, Rfl: 0    glyBURIDE-metformin 5-500 mg (GLUCOVANCE) 5-500 mg Tab, Take 2 tablets by mouth 2 (two) times daily with meals., Disp: 360 tablet, Rfl: 0    INVOKANA 300 mg Tab tablet, Take 1 tablet (300 mg total) by mouth once daily., Disp: 30 tablet, Rfl: 0    ipratropium (ATROVENT) 21 mcg (0.03 %) nasal spray, 2 sprays by Nasal route 2 (two) times daily., Disp: 30 mL, Rfl: 0    liraglutide 0.6 mg/0.1 mL, 18 mg/3 mL, subq PNIJ (VICTOZA 2-MARIA ISABEL) 0.6 mg/0.1 mL (18 mg/3 mL) PnIj pen, Inject 0.6 mg into the skin once daily., Disp: 9 mL, Rfl: 3    meloxicam (MOBIC) 15 MG tablet, Take 1 tablet (15 mg total) by mouth once daily., Disp: 30 tablet, Rfl: 0    methocarbamoL (ROBAXIN) 500 MG Tab, Take 1 tablet (500 mg total) by mouth 3 (three) times daily as needed (muscle spasms)., Disp: 30 tablet, Rfl: 1    VITAMIN D2 1,250 mcg (50,000 unit) capsule, Take 1 capsule (50,000 Units total) by mouth every 7 days., Disp: 12 capsule, Rfl: 2    gabapentin (NEURONTIN) 300 MG capsule, Take 1 capsule (300 mg total) by mouth every evening for 3 days, THEN 1 capsule (300 mg total) 2 (two) times daily for 3 days, THEN 1 capsule (300 mg total) 3 (three) times daily for 3 days, THEN 2 capsules (600 mg total) 2 (two) times daily for 3 days, THEN 2 capsules (600 mg total) 3 (three) times daily for 18 days., Disp: 138 capsule, Rfl: 0    pantoprazole (PROTONIX) 40 MG tablet, Take 1 tablet (40 mg total) by mouth once daily., Disp: 90 tablet, Rfl: 0   Review of patient's allergies indicates:  No Known Allergies   Social History     Tobacco Use    Smoking status: Former     Current packs/day: 0.00     Average packs/day: 1 pack/day for 0.2 years (0.2 ttl pk-yrs)     Types: Cigars, Cigarettes     Start date: 2014     Quit date: 10/16/2014     Years since quittin.9    Smokeless tobacco: Never   Substance Use Topics    Alcohol use:  Yes     Alcohol/week: 0.0 standard drinks of alcohol     Comment: occassionally      Family History   Problem Relation Age of Onset    Breast cancer Mother     Stroke Father     Hypertension Father     Breast cancer Maternal Aunt     Diabetes Maternal Aunt     Diabetes Brother     Diabetes Maternal Uncle     Diabetes Paternal Aunt     Hypertension Maternal Grandfather     Hypertension Paternal Grandmother     Diabetes Paternal Grandmother         Review of Systems   Integumentary:  Negative for color change, rash, mole/lesion, breast mass, breast discharge and breast tenderness.   Breast: Negative for mass and tenderness       Physical Exam   Constitutional: She is cooperative.   HENT:   Head: Normocephalic.   Pulmonary/Chest: She exhibits no mass. Right breast exhibits no mass, no skin change and no tenderness. Left breast exhibits no mass, no skin change and no tenderness. No breast swelling.   Bilateral breasts are surgically absent and are without signs of malignancy/recurrence.   Abdominal: Normal appearance.   Genitourinary: No breast swelling.   Musculoskeletal: Lymphadenopathy:      Upper Body:      Right upper body: No supraclavicular or axillary adenopathy.      Left upper body: No supraclavicular or axillary adenopathy.     Neurological: She is alert.   Skin: No rash noted.       CXR REPORT: Her lungs are well aerated and without worrisome masses; no pleural effusion noted; NEM      ASSESSMENT and PLAN OF CARE     1. Malignant neoplasm of upper-outer quadrant of left breast in female, estrogen receptor negative  Assessment & Plan:  She is doing well and will continue to be followed according to NCCN Guidelines. She understands that her imaging and exams have remained stable and is comfortable being followed in a conservative fashion. She understands the importance of monthly self-breast examination and knows to report any and all changes as they occur.    NOTE:::We viewed her films together at today's  visit.  We discussed the multiple views obtained and the important findings.  Even benign changes were mentioned and her questions were answered.  She knows that she may receive a formal letter or report from the Radiologist.  She is to contact us if she has questions.    Labs obtained today: CBC, Chem 12, CEA, LDH, CA27/29 - after resulted, these will be compared to her previous values and all abnormal values will be phoned to her for discussion.      Orders:  -     CEA  -     Lactate Dehydrogenase  -     CBC Auto Differential  -     Comprehensive Metabolic Panel  -     Cancer Antigen 27-29    2. 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      Medical Decision Making: TN - It is my impression that the patient suffers from all the listed conditions.  Each of these conditions did affect my Plan of Care and all medical decisions that were rendered.  The medical decision making was of high difficulty based on her comorbidities and her previous diagnosis of Triple Negative breast cancer, which can lead to an increased recurrence rate and pose a direct threat to her life.  Laboratory evaluations are currently pending but will be reviewed in the next 24 hours. Any further recommendations will be communicated to the patient by me.  I have reviewed and verified her allergies, list of medications, medical and surgical histories, social history, and a pertinent review of symptoms.     Follow up:  3 months and prn    For:  Physical Examination and LABS

## 2023-09-13 ENCOUNTER — OFFICE VISIT (OUTPATIENT)
Dept: SURGERY | Facility: CLINIC | Age: 48
End: 2023-09-13
Payer: COMMERCIAL

## 2023-09-13 DIAGNOSIS — C50.412 MALIGNANT NEOPLASM OF UPPER-OUTER QUADRANT OF LEFT BREAST IN FEMALE, ESTROGEN RECEPTOR NEGATIVE: Primary | ICD-10-CM

## 2023-09-13 DIAGNOSIS — Z15.01 GENETIC SUSCEPTIBILITY TO BREAST CANCER: ICD-10-CM

## 2023-09-13 DIAGNOSIS — Z17.1 MALIGNANT NEOPLASM OF UPPER-OUTER QUADRANT OF LEFT BREAST IN FEMALE, ESTROGEN RECEPTOR NEGATIVE: Primary | ICD-10-CM

## 2023-09-13 LAB
ALBUMIN SERPL BCP-MCNC: 4.3 G/DL (ref 3.5–5.2)
ALP SERPL-CCNC: 85 U/L (ref 55–135)
ALT SERPL W/O P-5'-P-CCNC: 14 U/L (ref 10–44)
ANION GAP SERPL CALC-SCNC: 11 MMOL/L (ref 8–16)
AST SERPL-CCNC: 13 U/L (ref 10–40)
BASOPHILS # BLD AUTO: 0.02 K/UL (ref 0–0.2)
BASOPHILS NFR BLD: 0.5 % (ref 0–1.9)
BILIRUB SERPL-MCNC: 0.2 MG/DL (ref 0.1–1)
BUN SERPL-MCNC: 7 MG/DL (ref 6–20)
CALCIUM SERPL-MCNC: 9.6 MG/DL (ref 8.7–10.5)
CHLORIDE SERPL-SCNC: 103 MMOL/L (ref 95–110)
CO2 SERPL-SCNC: 25 MMOL/L (ref 23–29)
CREAT SERPL-MCNC: 0.6 MG/DL (ref 0.5–1.4)
DIFFERENTIAL METHOD: ABNORMAL
EOSINOPHIL # BLD AUTO: 0.1 K/UL (ref 0–0.5)
EOSINOPHIL NFR BLD: 1.6 % (ref 0–8)
ERYTHROCYTE [DISTWIDTH] IN BLOOD BY AUTOMATED COUNT: 14.6 % (ref 11.5–14.5)
EST. GFR  (NO RACE VARIABLE): >60 ML/MIN/1.73 M^2
GLUCOSE SERPL-MCNC: 147 MG/DL (ref 70–110)
HCT VFR BLD AUTO: 39.4 % (ref 37–48.5)
HGB BLD-MCNC: 12.3 G/DL (ref 12–16)
IMM GRANULOCYTES # BLD AUTO: 0.02 K/UL (ref 0–0.04)
IMM GRANULOCYTES NFR BLD AUTO: 0.5 % (ref 0–0.5)
LYMPHOCYTES # BLD AUTO: 1.3 K/UL (ref 1–4.8)
LYMPHOCYTES NFR BLD: 29.4 % (ref 18–48)
MCH RBC QN AUTO: 26.1 PG (ref 27–31)
MCHC RBC AUTO-ENTMCNC: 31.2 G/DL (ref 32–36)
MCV RBC AUTO: 84 FL (ref 82–98)
MONOCYTES # BLD AUTO: 0.5 K/UL (ref 0.3–1)
MONOCYTES NFR BLD: 11.7 % (ref 4–15)
NEUTROPHILS # BLD AUTO: 2.4 K/UL (ref 1.8–7.7)
NEUTROPHILS NFR BLD: 56.3 % (ref 38–73)
NRBC BLD-RTO: 0 /100 WBC
PLATELET # BLD AUTO: 197 K/UL (ref 150–450)
PMV BLD AUTO: 11.8 FL (ref 9.2–12.9)
POTASSIUM SERPL-SCNC: 3.9 MMOL/L (ref 3.5–5.1)
PROT SERPL-MCNC: 7.3 G/DL (ref 6–8.4)
RBC # BLD AUTO: 4.72 M/UL (ref 4–5.4)
SODIUM SERPL-SCNC: 139 MMOL/L (ref 136–145)
WBC # BLD AUTO: 4.28 K/UL (ref 3.9–12.7)

## 2023-09-13 PROCEDURE — 80053 COMPREHEN METABOLIC PANEL: CPT | Performed by: SPECIALIST

## 2023-09-13 PROCEDURE — 86300 IMMUNOASSAY TUMOR CA 15-3: CPT | Performed by: SPECIALIST

## 2023-09-13 PROCEDURE — 99214 OFFICE O/P EST MOD 30 MIN: CPT | Mod: S$GLB,,, | Performed by: SPECIALIST

## 2023-09-13 PROCEDURE — 82378 CARCINOEMBRYONIC ANTIGEN: CPT | Performed by: SPECIALIST

## 2023-09-13 PROCEDURE — 85025 COMPLETE CBC W/AUTO DIFF WBC: CPT | Performed by: SPECIALIST

## 2023-09-13 PROCEDURE — 99214 PR OFFICE/OUTPT VISIT, EST, LEVL IV, 30-39 MIN: ICD-10-PCS | Mod: S$GLB,,, | Performed by: SPECIALIST

## 2023-09-13 PROCEDURE — 83615 LACTATE (LD) (LDH) ENZYME: CPT | Performed by: SPECIALIST

## 2023-09-14 LAB
CEA SERPL-MCNC: 1.8 NG/ML (ref 0–5)
LDH SERPL L TO P-CCNC: 136 U/L (ref 110–260)

## 2023-09-15 LAB — CANCER AG27-29 SERPL-ACNC: 13.3 U/ML

## 2023-09-25 ENCOUNTER — TELEPHONE (OUTPATIENT)
Dept: SURGERY | Facility: CLINIC | Age: 48
End: 2023-09-25
Payer: COMMERCIAL

## 2023-12-18 ENCOUNTER — OFFICE VISIT (OUTPATIENT)
Dept: SURGERY | Facility: CLINIC | Age: 48
End: 2023-12-18
Payer: COMMERCIAL

## 2023-12-18 DIAGNOSIS — C50.412 MALIGNANT NEOPLASM OF UPPER-OUTER QUADRANT OF LEFT BREAST IN FEMALE, ESTROGEN RECEPTOR NEGATIVE: Primary | ICD-10-CM

## 2023-12-18 DIAGNOSIS — Z15.01 GENETIC SUSCEPTIBILITY TO BREAST CANCER: ICD-10-CM

## 2023-12-18 DIAGNOSIS — Z17.1 MALIGNANT NEOPLASM OF UPPER-OUTER QUADRANT OF LEFT BREAST IN FEMALE, ESTROGEN RECEPTOR NEGATIVE: Primary | ICD-10-CM

## 2023-12-18 PROCEDURE — 99999 PR PBB SHADOW E&M-EST. PATIENT-LVL III: ICD-10-PCS | Mod: PBBFAC,,, | Performed by: NURSE PRACTITIONER

## 2023-12-18 PROCEDURE — 83615 LACTATE (LD) (LDH) ENZYME: CPT | Performed by: NURSE PRACTITIONER

## 2023-12-18 PROCEDURE — 85025 COMPLETE CBC W/AUTO DIFF WBC: CPT | Performed by: NURSE PRACTITIONER

## 2023-12-18 PROCEDURE — 82378 CARCINOEMBRYONIC ANTIGEN: CPT | Performed by: NURSE PRACTITIONER

## 2023-12-18 PROCEDURE — 99999 PR PBB SHADOW E&M-EST. PATIENT-LVL III: CPT | Mod: PBBFAC,,, | Performed by: NURSE PRACTITIONER

## 2023-12-18 PROCEDURE — 86300 IMMUNOASSAY TUMOR CA 15-3: CPT | Performed by: NURSE PRACTITIONER

## 2023-12-18 PROCEDURE — 99214 OFFICE O/P EST MOD 30 MIN: CPT | Mod: S$GLB,,, | Performed by: NURSE PRACTITIONER

## 2023-12-18 PROCEDURE — 80053 COMPREHEN METABOLIC PANEL: CPT | Performed by: NURSE PRACTITIONER

## 2023-12-18 PROCEDURE — 99214 PR OFFICE/OUTPT VISIT, EST, LEVL IV, 30-39 MIN: ICD-10-PCS | Mod: S$GLB,,, | Performed by: NURSE PRACTITIONER

## 2023-12-18 NOTE — ASSESSMENT & PLAN NOTE
She is doing well and will continue to be followed according to NCCN Guidelines. She understands that her exams have remained stable and is comfortable being followed in a conservative fashion. She understands the importance of monthly self-breast examination and knows to report any and all changes as they occur.  questions.    Labs obtained today: CBC, Chem 12, CEA, LDH, CA27/29 - after resulted, these will be compared to her previous values and all abnormal values will be phoned to her for discussion.

## 2023-12-18 NOTE — PROGRESS NOTES
[facility]  Christiano Agee MD, FACS  Brandy Gtz NP-THEA      Date of Service: 12/18/2023    Chief Complaint:   Gonzalo Cordova is a 47 y.o. female presenting today for her 3 month follow up to her breast cancer diagnosis.  She is due for a Physical Examination.  She reports no interval changes.     History of Present Illness:   Mrs. Cordova was diagnosed with multifocal Triple Negative left breast cancer in May 2022 at the age of 46.  One SLN was positive for isolated tumor cells.  She completed alexei-adjvuant chemotherapy. She elected bilateral mastectomies that showed NEM on the right.  Left pathology showed a residual 8 and 9 mm IDC with negative margins (albeit 1 mm posteriorly).  PALB 2 Positive (2022).   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      Past Surgical History:   Procedure Laterality Date      Bilateral simple mastecomy followed by reconstruction 12/20/22 12/20/2022      Left axillary LN sonocore biopsy 5- 05/20/2022      left breast sonocore biopsy times three 5-5-2022 05/05/2022     Left SLN biopsy with Port Placement 5- 05/22/2022    BILATERAL TUBAL LIGATION  2000    CARPAL TUNNEL RELEASE Left 2014    COLONOSCOPY      ESOPHAGOGASTRODUODENOSCOPY      HYSTERECTOMY  2015    hys only; heavy cycles    LITHOTRIPSY           Current Outpatient Medications:     clotrimazole-betamethasone 1-0.05% (LOTRISONE) cream, Apply topically 2 (two) times daily., Disp: 45 g, Rfl: 0    dextroamphetamine-amphetamine (ADDERALL) 20 mg tablet, Take 1 tablet by mouth once daily., Disp: 30 tablet, Rfl: 0    famotidine (PEPCID) 40 MG tablet, Take 40 mg by mouth daily as needed. , Disp: , Rfl:     glyBURIDE-metformin 5-500 mg (GLUCOVANCE) 5-500 mg Tab, Take 2 tablets by mouth 2 (two) times daily with meals., Disp: 360 tablet, Rfl: 0    INVOKANA 300 mg Tab tablet, Take 1 tablet (300 mg total) by mouth once daily., Disp: 30 tablet, Rfl: 0    ipratropium (ATROVENT) 21 mcg (0.03 %) nasal spray, 2 sprays by Nasal route 2 (two) times daily., Disp: 30 mL, Rfl: 0    liraglutide 0.6 mg/0.1 mL, 18 mg/3 mL, subq PNIJ (VICTOZA 2-MARIA ISABEL) 0.6 mg/0.1 mL (18 mg/3 mL) PnIj pen, Inject 0.6 mg into the skin once daily., Disp: 9 mL, Rfl: 3    meloxicam (MOBIC) 15 MG tablet, Take 1 tablet (15 mg total) by mouth once daily., Disp: 30 tablet, Rfl: 0    methocarbamoL (ROBAXIN) 500 MG Tab, Take 1 tablet (500 mg total) by mouth 3 (three) times daily as needed (muscle spasms)., Disp: 30 tablet, Rfl: 1    VITAMIN D2 1,250 mcg (50,000 unit) capsule, Take 1 capsule (50,000 Units total) by mouth every 7 days., Disp: 12 capsule, Rfl: 2    fluticasone propionate (FLONASE) 50 mcg/actuation nasal spray, 1 spray (50 mcg total) by Each Nostril route once daily. (Patient not taking: Reported on 12/18/2023), Disp: 16 g, Rfl: 0    gabapentin (NEURONTIN) 300 MG capsule, Take 1 capsule (300 mg total) by mouth every evening for 3 days, THEN 1 capsule (300 mg total) 2 (two) times daily for 3 days, THEN 1 capsule (300 mg total) 3 (three) times daily for 3 days, THEN 2 capsules (600 mg total) 2 (two) times daily for 3 days, THEN 2 capsules (600 mg total) 3 (three) times daily for 18 days., Disp: 138 capsule, Rfl: 0    pantoprazole (PROTONIX) 40 MG tablet, Take 1 tablet (40 mg total) by mouth once  daily., Disp: 90 tablet, Rfl: 0   Review of patient's allergies indicates:  No Known Allergies   Social History     Tobacco Use    Smoking status: Former     Current packs/day: 0.00     Average packs/day: 1 pack/day for 0.2 years (0.2 ttl pk-yrs)     Types: Cigars, Cigarettes     Start date: 2014     Quit date: 10/16/2014     Years since quittin.1    Smokeless tobacco: Never   Substance Use Topics    Alcohol use: Yes     Alcohol/week: 0.0 standard drinks of alcohol     Comment: occassionally      Family History   Problem Relation Age of Onset    Breast cancer Mother         dx <50    Stroke Father     Hypertension Father     Diabetes Brother     Hypertension Maternal Grandfather     Hypertension Paternal Grandmother     Diabetes Paternal Grandmother     Breast cancer Maternal Aunt         dx <50    Diabetes Maternal Aunt     Diabetes Maternal Uncle     Diabetes Paternal Aunt     Ovarian cancer Neg Hx         Review of Systems   Integumentary:  Negative for color change, rash, mole/lesion, breast mass, breast discharge and breast tenderness.   Breast: Negative for mass and tenderness       Physical Exam   Constitutional: She is cooperative.   Pulmonary/Chest: She exhibits no mass. Right breast exhibits no mass, no skin change and no tenderness. Left breast exhibits no mass, no skin change and no tenderness. No breast swelling.   Left- Surgically absent with NEM/Recurrence. Right- Surgically absent with NEM/Recurrence.    Abdominal: Normal appearance.   Genitourinary: No breast swelling.   Musculoskeletal: Lymphadenopathy:      Upper Body:      Right upper body: No supraclavicular or axillary adenopathy.      Left upper body: No supraclavicular or axillary adenopathy.     Neurological: She is alert.   Skin: No rash noted.          ASSESSMENT and PLAN OF CARE     1. Malignant neoplasm of upper-outer quadrant of left breast in female, estrogen receptor negative  Assessment & Plan:  She is doing well and will  continue to be followed according to NCCN Guidelines. She understands that her exams have remained stable and is comfortable being followed in a conservative fashion. She understands the importance of monthly self-breast examination and knows to report any and all changes as they occur.  questions.    Labs obtained today: CBC, Chem 12, CEA, LDH, CA27/29 - after resulted, these will be compared to her previous values and all abnormal values will be phoned to her for discussion.      Orders:  -     Lactate Dehydrogenase  -     Comprehensive Metabolic Panel  -     CEA  -     CBC Auto Differential  -     Cancer Antigen 27-29    2. 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      Medical Decision Making: It is my impression that this patient suffers all conditions contained in this medical document.  Each of these conditions did affect our plan of care and my medical decision making today.  It is my opinion that the medical decision making concerning this patient was of moderate difficulty based on the aforementioned conditions.  Any further recommendations will be communicated to the patient by me.  I have reviewed and verified her allergies, list of medications, medical and surgical histories, social history, and a pertinent review of symptoms.    Follow up:  3 Months and PRN     For:Ultrasound with Dr. Agee

## 2023-12-19 LAB
ALBUMIN SERPL BCP-MCNC: 4.2 G/DL (ref 3.5–5.2)
ALP SERPL-CCNC: 71 U/L (ref 55–135)
ALT SERPL W/O P-5'-P-CCNC: 16 U/L (ref 10–44)
ANION GAP SERPL CALC-SCNC: 9 MMOL/L (ref 8–16)
AST SERPL-CCNC: 15 U/L (ref 10–40)
BASOPHILS # BLD AUTO: 0.01 K/UL (ref 0–0.2)
BASOPHILS NFR BLD: 0.2 % (ref 0–1.9)
BILIRUB SERPL-MCNC: 0.3 MG/DL (ref 0.1–1)
BUN SERPL-MCNC: 6 MG/DL (ref 6–20)
CALCIUM SERPL-MCNC: 9.5 MG/DL (ref 8.7–10.5)
CEA SERPL-MCNC: 2.1 NG/ML (ref 0–5)
CHLORIDE SERPL-SCNC: 103 MMOL/L (ref 95–110)
CO2 SERPL-SCNC: 25 MMOL/L (ref 23–29)
CREAT SERPL-MCNC: 0.7 MG/DL (ref 0.5–1.4)
DIFFERENTIAL METHOD: NORMAL
EOSINOPHIL # BLD AUTO: 0 K/UL (ref 0–0.5)
EOSINOPHIL NFR BLD: 0.9 % (ref 0–8)
ERYTHROCYTE [DISTWIDTH] IN BLOOD BY AUTOMATED COUNT: 13.6 % (ref 11.5–14.5)
EST. GFR  (NO RACE VARIABLE): >60 ML/MIN/1.73 M^2
GLUCOSE SERPL-MCNC: 176 MG/DL (ref 70–110)
HCT VFR BLD AUTO: 39 % (ref 37–48.5)
HGB BLD-MCNC: 12.8 G/DL (ref 12–16)
IMM GRANULOCYTES # BLD AUTO: 0.01 K/UL (ref 0–0.04)
IMM GRANULOCYTES NFR BLD AUTO: 0.2 % (ref 0–0.5)
LDH SERPL L TO P-CCNC: 125 U/L (ref 110–260)
LYMPHOCYTES # BLD AUTO: 1.5 K/UL (ref 1–4.8)
LYMPHOCYTES NFR BLD: 32.7 % (ref 18–48)
MCH RBC QN AUTO: 27.4 PG (ref 27–31)
MCHC RBC AUTO-ENTMCNC: 32.8 G/DL (ref 32–36)
MCV RBC AUTO: 83 FL (ref 82–98)
MONOCYTES # BLD AUTO: 0.5 K/UL (ref 0.3–1)
MONOCYTES NFR BLD: 10.8 % (ref 4–15)
NEUTROPHILS # BLD AUTO: 2.5 K/UL (ref 1.8–7.7)
NEUTROPHILS NFR BLD: 55.2 % (ref 38–73)
NRBC BLD-RTO: 0 /100 WBC
PLATELET # BLD AUTO: 206 K/UL (ref 150–450)
PMV BLD AUTO: 12.3 FL (ref 9.2–12.9)
POTASSIUM SERPL-SCNC: 4 MMOL/L (ref 3.5–5.1)
PROT SERPL-MCNC: 7.1 G/DL (ref 6–8.4)
RBC # BLD AUTO: 4.68 M/UL (ref 4–5.4)
SODIUM SERPL-SCNC: 137 MMOL/L (ref 136–145)
WBC # BLD AUTO: 4.52 K/UL (ref 3.9–12.7)

## 2023-12-20 LAB — CANCER AG27-29 SERPL-ACNC: 13.4 U/ML

## 2024-03-05 ENCOUNTER — TELEPHONE (OUTPATIENT)
Dept: SURGERY | Facility: CLINIC | Age: 49
End: 2024-03-05
Payer: COMMERCIAL

## 2024-03-05 NOTE — TELEPHONE ENCOUNTER
Called to inform the pt that she is schedule incorrectly(on the wrong provider schedule) was able to move the pt to March 19th with Dr. Agee for an appt. Pt understood and confirmed date and time.

## 2024-03-06 NOTE — PROGRESS NOTES
Date of Service: 3/19/2024    Chief Complaint:   Gonzalo Cordova is a 48 y.o. female presenting today for her 3-month follow up due to her breast cancer diagnosis.  She is due for her Ultrasound.  She reports no interval changes on her self-breast examination.     History of Present Illness:   Mrs. Cordova was diagnosed with multifocal Triple Negative left breast cancer in May 2022 at the age of 46.  One SLN was positive for isolated tumor cells.  She completed alexei-adjvuant chemotherapy. She elected bilateral mastectomies that showed NEM on the right.  Left pathology showed a residual 8 and 9 mm IDC with negative margins (albeit 1 mm posteriorly).  PALB 2 Positive (2022).   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      Past Surgical History:   Procedure Laterality Date      Bilateral simple mastecomy followed by reconstruction 12/20/22 12/20/2022      Left axillary LN sonocore biopsy 5- 05/20/2022      left breast sonocore biopsy times three 5-5-2022 05/05/2022     Left SLN biopsy with Port Placement 5- 05/22/2022    BILATERAL TUBAL LIGATION  2000    CARPAL TUNNEL RELEASE Left 2014    COLONOSCOPY      ESOPHAGOGASTRODUODENOSCOPY      HYSTERECTOMY  2015    hys only; heavy cycles    LITHOTRIPSY          Current Outpatient Medications:     clotrimazole-betamethasone 1-0.05%  (LOTRISONE) cream, Apply topically 2 (two) times daily., Disp: 45 g, Rfl: 0    dextroamphetamine-amphetamine (ADDERALL) 20 mg tablet, Take 1 tablet by mouth once daily., Disp: 30 tablet, Rfl: 0    famotidine (PEPCID) 40 MG tablet, Take 40 mg by mouth daily as needed. , Disp: , Rfl:     glyBURIDE-metformin 5-500 mg (GLUCOVANCE) 5-500 mg Tab, Take 2 tablets by mouth 2 (two) times daily with meals., Disp: 360 tablet, Rfl: 0    INVOKANA 300 mg Tab tablet, Take 1 tablet (300 mg total) by mouth once daily., Disp: 30 tablet, Rfl: 0    ipratropium (ATROVENT) 21 mcg (0.03 %) nasal spray, 2 sprays by Nasal route 2 (two) times daily., Disp: 30 mL, Rfl: 0    liraglutide 0.6 mg/0.1 mL, 18 mg/3 mL, subq PNIJ (VICTOZA 2-MARIA ISABEL) 0.6 mg/0.1 mL (18 mg/3 mL) PnIj pen, Inject 0.6 mg into the skin once daily., Disp: 9 mL, Rfl: 3    meloxicam (MOBIC) 15 MG tablet, Take 1 tablet (15 mg total) by mouth once daily., Disp: 30 tablet, Rfl: 0    methocarbamoL (ROBAXIN) 500 MG Tab, Take 1 tablet (500 mg total) by mouth 3 (three) times daily as needed (muscle spasms)., Disp: 30 tablet, Rfl: 1    VITAMIN D2 1,250 mcg (50,000 unit) capsule, Take 1 capsule (50,000 Units total) by mouth every 7 days., Disp: 12 capsule, Rfl: 2    fluticasone propionate (FLONASE) 50 mcg/actuation nasal spray, 1 spray (50 mcg total) by Each Nostril route once daily. (Patient not taking: Reported on 12/18/2023), Disp: 16 g, Rfl: 0    gabapentin (NEURONTIN) 300 MG capsule, Take 1 capsule (300 mg total) by mouth every evening for 3 days, THEN 1 capsule (300 mg total) 2 (two) times daily for 3 days, THEN 1 capsule (300 mg total) 3 (three) times daily for 3 days, THEN 2 capsules (600 mg total) 2 (two) times daily for 3 days, THEN 2 capsules (600 mg total) 3 (three) times daily for 18 days., Disp: 138 capsule, Rfl: 0    pantoprazole (PROTONIX) 40 MG tablet, Take 1 tablet (40 mg total) by mouth once daily., Disp: 90 tablet, Rfl: 0   Review of patient's allergies indicates:  No  Known Allergies   Social History     Tobacco Use    Smoking status: Former     Current packs/day: 0.00     Average packs/day: 1 pack/day for 0.2 years (0.2 ttl pk-yrs)     Types: Cigars, Cigarettes     Start date: 2014     Quit date: 10/16/2014     Years since quittin.4    Smokeless tobacco: Never   Substance Use Topics    Alcohol use: Yes     Alcohol/week: 0.0 standard drinks of alcohol     Comment: occassionally      Family History   Problem Relation Age of Onset    Breast cancer Mother         dx <50    Stroke Father     Hypertension Father     Diabetes Brother     Hypertension Maternal Grandfather     Hypertension Paternal Grandmother     Diabetes Paternal Grandmother     Breast cancer Maternal Aunt         dx <50    Diabetes Maternal Aunt     Diabetes Maternal Uncle     Diabetes Paternal Aunt     Ovarian cancer Neg Hx         Review of Systems   Integumentary:  Negative for color change, rash, mole/lesion, breast mass, breast discharge and breast tenderness.   Breast: Negative for mass and tenderness       Physical Exam   Constitutional: She is cooperative.   HENT:   Head: Normocephalic.   Pulmonary/Chest: She exhibits no mass. Right breast exhibits no mass, no skin change and no tenderness. Left breast exhibits no mass, no skin change and no tenderness. No breast swelling.   Bilateral breasts are surgically absent and are without signs of malignancy/recurrence.   Abdominal: Normal appearance.   Genitourinary: No breast swelling.   Musculoskeletal: Lymphadenopathy:      Upper Body:      Right upper body: No supraclavicular or axillary adenopathy.      Left upper body: No supraclavicular or axillary adenopathy.     Neurological: She is alert.   Skin: No rash noted.        ULTRASOUND EVALUATION and REPORT    Bilateral real-time Ultrasound was performed by me.  The chest wall (from clavicle to inframammary fold and from sternum to anterior border of the latissimus) and axillary basins were  scanned.    Bilateral breasts are surgically absent.    Right Breast: She has normal tissues in the right breast;  she has normal skin thickness with no subcutaneous nodules or skin thickening; NEM.    Left Breast: She has normal tissues in the left breast;  she has normal skin thickness with no subcutaneous nodules or skin thickening; NEM.    Axillae: There are no abnormal lymph nodes seen bilaterally.     Impression: Normal tissue bilaterally with no solid/suspicious masses noted. No LAD in bilateral axillae.      BIRADS: Category 2 - Benign Finding.    Findings were discussed with patient in real time and a hand written report was given to her at the conclusion of the exam.       ASSESSMENT and PLAN OF CARE     1. Malignant neoplasm of upper-outer quadrant of left breast in female, estrogen receptor negative  Assessment & Plan:  She is doing well and will continue to be followed according to NCCN Guidelines. She understands that her imaging and exams have remained stable and is comfortable being followed in a conservative fashion. She understands the importance of monthly self-breast examination and knows to report any and all changes as they occur.    Labs obtained today: CBC, Chem 12, CEA, LDH, CA 27.29 - after resulted, these will be compared to her previous values and all abnormal values will be phoned to her for discussion.      Orders:  -     Cancer Antigen 27-29  -     Comprehensive Metabolic Panel  -     Lactate Dehydrogenase  -     CEA  -     CBC Auto Differential    2. 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      Medical Decision Making: TN - It is my impression that the patient suffers from all the listed conditions.  Each of these conditions did affect my Plan of Care and all medical decisions that were rendered.  The medical decision making was of high difficulty based on her comorbidities and her previous diagnosis of  Triple Negative breast cancer, which can lead to an increased recurrence rate and pose a direct threat to her life.  Laboratory evaluations are currently pending but will be reviewed in the next 24 hours. Any further recommendations will be communicated to the patient by me.  I have reviewed and verified her allergies, list of medications, medical and surgical histories, social history, and a pertinent review of symptoms.     Follow up:  3 months and prn  for Physical Exam

## 2024-03-19 ENCOUNTER — OFFICE VISIT (OUTPATIENT)
Dept: SURGERY | Facility: CLINIC | Age: 49
End: 2024-03-19
Payer: COMMERCIAL

## 2024-03-19 DIAGNOSIS — C50.412 MALIGNANT NEOPLASM OF UPPER-OUTER QUADRANT OF LEFT BREAST IN FEMALE, ESTROGEN RECEPTOR NEGATIVE: Primary | ICD-10-CM

## 2024-03-19 DIAGNOSIS — Z15.01 GENETIC SUSCEPTIBILITY TO BREAST CANCER: ICD-10-CM

## 2024-03-19 DIAGNOSIS — Z17.1 MALIGNANT NEOPLASM OF UPPER-OUTER QUADRANT OF LEFT BREAST IN FEMALE, ESTROGEN RECEPTOR NEGATIVE: Primary | ICD-10-CM

## 2024-03-19 LAB
ALBUMIN SERPL BCP-MCNC: 4.5 G/DL (ref 3.5–5.2)
ALP SERPL-CCNC: 96 U/L (ref 55–135)
ALT SERPL W/O P-5'-P-CCNC: 16 U/L (ref 10–44)
ANION GAP SERPL CALC-SCNC: 14 MMOL/L (ref 8–16)
AST SERPL-CCNC: 12 U/L (ref 10–40)
BASOPHILS # BLD AUTO: 0.02 K/UL (ref 0–0.2)
BASOPHILS NFR BLD: 0.4 % (ref 0–1.9)
BILIRUB SERPL-MCNC: 0.3 MG/DL (ref 0.1–1)
BUN SERPL-MCNC: 10 MG/DL (ref 6–20)
CALCIUM SERPL-MCNC: 10.5 MG/DL (ref 8.7–10.5)
CEA SERPL-MCNC: 2.6 NG/ML (ref 0–5)
CHLORIDE SERPL-SCNC: 100 MMOL/L (ref 95–110)
CO2 SERPL-SCNC: 24 MMOL/L (ref 23–29)
CREAT SERPL-MCNC: 0.8 MG/DL (ref 0.5–1.4)
DIFFERENTIAL METHOD BLD: ABNORMAL
EOSINOPHIL # BLD AUTO: 0 K/UL (ref 0–0.5)
EOSINOPHIL NFR BLD: 0.8 % (ref 0–8)
ERYTHROCYTE [DISTWIDTH] IN BLOOD BY AUTOMATED COUNT: 13.3 % (ref 11.5–14.5)
EST. GFR  (NO RACE VARIABLE): >60 ML/MIN/1.73 M^2
GLUCOSE SERPL-MCNC: 301 MG/DL (ref 70–110)
HCT VFR BLD AUTO: 43.5 % (ref 37–48.5)
HGB BLD-MCNC: 13.9 G/DL (ref 12–16)
IMM GRANULOCYTES # BLD AUTO: 0.01 K/UL (ref 0–0.04)
IMM GRANULOCYTES NFR BLD AUTO: 0.2 % (ref 0–0.5)
LDH SERPL L TO P-CCNC: 127 U/L (ref 110–260)
LYMPHOCYTES # BLD AUTO: 2 K/UL (ref 1–4.8)
LYMPHOCYTES NFR BLD: 39.6 % (ref 18–48)
MCH RBC QN AUTO: 26.5 PG (ref 27–31)
MCHC RBC AUTO-ENTMCNC: 32 G/DL (ref 32–36)
MCV RBC AUTO: 83 FL (ref 82–98)
MONOCYTES # BLD AUTO: 0.4 K/UL (ref 0.3–1)
MONOCYTES NFR BLD: 7.7 % (ref 4–15)
NEUTROPHILS # BLD AUTO: 2.5 K/UL (ref 1.8–7.7)
NEUTROPHILS NFR BLD: 51.3 % (ref 38–73)
NRBC BLD-RTO: 0 /100 WBC
PLATELET # BLD AUTO: 195 K/UL (ref 150–450)
PMV BLD AUTO: 12.9 FL (ref 9.2–12.9)
POTASSIUM SERPL-SCNC: 3.6 MMOL/L (ref 3.5–5.1)
PROT SERPL-MCNC: 7.9 G/DL (ref 6–8.4)
RBC # BLD AUTO: 5.25 M/UL (ref 4–5.4)
SODIUM SERPL-SCNC: 138 MMOL/L (ref 136–145)
WBC # BLD AUTO: 4.95 K/UL (ref 3.9–12.7)

## 2024-03-19 PROCEDURE — 86300 IMMUNOASSAY TUMOR CA 15-3: CPT | Performed by: SPECIALIST

## 2024-03-19 PROCEDURE — 85025 COMPLETE CBC W/AUTO DIFF WBC: CPT | Performed by: SPECIALIST

## 2024-03-19 PROCEDURE — 83615 LACTATE (LD) (LDH) ENZYME: CPT | Performed by: SPECIALIST

## 2024-03-19 PROCEDURE — 99214 OFFICE O/P EST MOD 30 MIN: CPT | Mod: S$GLB,,, | Performed by: SPECIALIST

## 2024-03-19 PROCEDURE — 99999 PR PBB SHADOW E&M-EST. PATIENT-LVL II: CPT | Mod: PBBFAC,,, | Performed by: SPECIALIST

## 2024-03-19 PROCEDURE — 80053 COMPREHEN METABOLIC PANEL: CPT | Performed by: SPECIALIST

## 2024-03-19 PROCEDURE — 82378 CARCINOEMBRYONIC ANTIGEN: CPT | Performed by: SPECIALIST

## 2024-03-21 LAB — CANCER AG27-29 SERPL-ACNC: 15.4 U/ML

## 2024-06-10 ENCOUNTER — OFFICE VISIT (OUTPATIENT)
Dept: SURGERY | Facility: CLINIC | Age: 49
End: 2024-06-10
Payer: COMMERCIAL

## 2024-06-10 DIAGNOSIS — C50.412 MALIGNANT NEOPLASM OF UPPER-OUTER QUADRANT OF LEFT BREAST IN FEMALE, ESTROGEN RECEPTOR NEGATIVE: Primary | ICD-10-CM

## 2024-06-10 DIAGNOSIS — Z15.01 GENETIC SUSCEPTIBILITY TO BREAST CANCER: ICD-10-CM

## 2024-06-10 DIAGNOSIS — Z17.1 MALIGNANT NEOPLASM OF UPPER-OUTER QUADRANT OF LEFT BREAST IN FEMALE, ESTROGEN RECEPTOR NEGATIVE: Primary | ICD-10-CM

## 2024-06-10 LAB
BASOPHILS # BLD AUTO: 0.03 K/UL (ref 0–0.2)
BASOPHILS NFR BLD: 0.6 % (ref 0–1.9)
DIFFERENTIAL METHOD BLD: ABNORMAL
EOSINOPHIL # BLD AUTO: 0.1 K/UL (ref 0–0.5)
EOSINOPHIL NFR BLD: 1.5 % (ref 0–8)
ERYTHROCYTE [DISTWIDTH] IN BLOOD BY AUTOMATED COUNT: 13.7 % (ref 11.5–14.5)
HCT VFR BLD AUTO: 41.7 % (ref 37–48.5)
HGB BLD-MCNC: 13.4 G/DL (ref 12–16)
IMM GRANULOCYTES # BLD AUTO: 0.01 K/UL (ref 0–0.04)
IMM GRANULOCYTES NFR BLD AUTO: 0.2 % (ref 0–0.5)
LYMPHOCYTES # BLD AUTO: 1.6 K/UL (ref 1–4.8)
LYMPHOCYTES NFR BLD: 33.4 % (ref 18–48)
MCH RBC QN AUTO: 27 PG (ref 27–31)
MCHC RBC AUTO-ENTMCNC: 32.1 G/DL (ref 32–36)
MCV RBC AUTO: 84 FL (ref 82–98)
MONOCYTES # BLD AUTO: 0.4 K/UL (ref 0.3–1)
MONOCYTES NFR BLD: 9.4 % (ref 4–15)
NEUTROPHILS # BLD AUTO: 2.6 K/UL (ref 1.8–7.7)
NEUTROPHILS NFR BLD: 54.9 % (ref 38–73)
NRBC BLD-RTO: 0 /100 WBC
PLATELET # BLD AUTO: 198 K/UL (ref 150–450)
PMV BLD AUTO: 13.3 FL (ref 9.2–12.9)
RBC # BLD AUTO: 4.97 M/UL (ref 4–5.4)
WBC # BLD AUTO: 4.67 K/UL (ref 3.9–12.7)

## 2024-06-10 PROCEDURE — 99214 OFFICE O/P EST MOD 30 MIN: CPT | Mod: S$GLB,,, | Performed by: NURSE PRACTITIONER

## 2024-06-10 PROCEDURE — 85025 COMPLETE CBC W/AUTO DIFF WBC: CPT | Performed by: NURSE PRACTITIONER

## 2024-06-10 PROCEDURE — 99999 PR PBB SHADOW E&M-EST. PATIENT-LVL III: CPT | Mod: PBBFAC,,, | Performed by: NURSE PRACTITIONER

## 2024-06-10 PROCEDURE — 80053 COMPREHEN METABOLIC PANEL: CPT | Performed by: NURSE PRACTITIONER

## 2024-06-10 PROCEDURE — 86300 IMMUNOASSAY TUMOR CA 15-3: CPT | Performed by: NURSE PRACTITIONER

## 2024-06-10 PROCEDURE — 82378 CARCINOEMBRYONIC ANTIGEN: CPT | Performed by: NURSE PRACTITIONER

## 2024-06-10 PROCEDURE — 83615 LACTATE (LD) (LDH) ENZYME: CPT | Performed by: NURSE PRACTITIONER

## 2024-06-10 RX ORDER — SEMAGLUTIDE 0.68 MG/ML
0.5 INJECTION, SOLUTION SUBCUTANEOUS
COMMUNITY

## 2024-06-10 RX ORDER — ROSUVASTATIN CALCIUM 10 MG/1
1 TABLET, COATED ORAL DAILY
COMMUNITY
Start: 2023-08-24

## 2024-06-10 NOTE — PROGRESS NOTES
Date of Service: 6/10/2024    Chief Complaint:   Gonzalo Cordova is a 48 y.o. female presenting today for her 3 month follow up to her breast cancer diagnosis.  She is due for a Physical Examination.  She reports no interval changes.     History of Present Illness: Mrs. Cordova was diagnosed with multifocal Triple Negative left breast cancer in May 2022 at the age of 46.  One SLN was positive for isolated tumor cells.  She completed alexei-adjvuant chemotherapy. She elected bilateral mastectomies that showed NEM on the right.  Left pathology showed a residual 8 and 9 mm IDC with negative margins (albeit 1 mm posteriorly).  PALB 2 Positive (2022).   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      Past Surgical History:   Procedure Laterality Date      Bilateral simple mastecomy followed by reconstruction 12/20/22 12/20/2022      Left axillary LN sonocore biopsy 5- 05/20/2022      left breast sonocore biopsy times three 5-5-2022 05/05/2022     Left SLN biopsy with Port Placement 5- 05/22/2022    BILATERAL TUBAL LIGATION  2000    CARPAL TUNNEL RELEASE Left 2014    COLONOSCOPY      ESOPHAGOGASTRODUODENOSCOPY      HYSTERECTOMY  2015    hys only; heavy cycles    LITHOTRIPSY          Current Outpatient Medications:     clotrimazole-betamethasone 1-0.05% (LOTRISONE) cream, Apply  topically 2 (two) times daily., Disp: 45 g, Rfl: 0    dextroamphetamine-amphetamine (ADDERALL) 20 mg tablet, Take 1 tablet by mouth once daily., Disp: 30 tablet, Rfl: 0    empagliflozin (JARDIANCE) 25 mg tablet, Take 25 mg by mouth., Disp: , Rfl:     famotidine (PEPCID) 40 MG tablet, Take 40 mg by mouth daily as needed. , Disp: , Rfl:     glyBURIDE-metformin 5-500 mg (GLUCOVANCE) 5-500 mg Tab, Take 2 tablets by mouth 2 (two) times daily with meals., Disp: 360 tablet, Rfl: 0    INVOKANA 300 mg Tab tablet, Take 1 tablet (300 mg total) by mouth once daily., Disp: 30 tablet, Rfl: 0    ipratropium (ATROVENT) 21 mcg (0.03 %) nasal spray, 2 sprays by Nasal route 2 (two) times daily., Disp: 30 mL, Rfl: 0    liraglutide 0.6 mg/0.1 mL, 18 mg/3 mL, subq PNIJ (VICTOZA 2-MARIA ISABEL) 0.6 mg/0.1 mL (18 mg/3 mL) PnIj pen, Inject 0.6 mg into the skin once daily., Disp: 9 mL, Rfl: 3    meloxicam (MOBIC) 15 MG tablet, Take 1 tablet (15 mg total) by mouth once daily., Disp: 30 tablet, Rfl: 0    methocarbamoL (ROBAXIN) 500 MG Tab, Take 1 tablet (500 mg total) by mouth 3 (three) times daily as needed (muscle spasms)., Disp: 30 tablet, Rfl: 1    OZEMPIC 0.25 mg or 0.5 mg (2 mg/3 mL) pen injector, Inject 0.5 mg into the skin every 7 days., Disp: , Rfl:     rosuvastatin (CRESTOR) 10 MG tablet, Take 1 tablet by mouth once daily., Disp: , Rfl:     VITAMIN D2 1,250 mcg (50,000 unit) capsule, Take 1 capsule (50,000 Units total) by mouth every 7 days., Disp: 12 capsule, Rfl: 2    fluticasone propionate (FLONASE) 50 mcg/actuation nasal spray, 1 spray (50 mcg total) by Each Nostril route once daily. (Patient not taking: Reported on 12/18/2023), Disp: 16 g, Rfl: 0    gabapentin (NEURONTIN) 300 MG capsule, Take 1 capsule (300 mg total) by mouth every evening for 3 days, THEN 1 capsule (300 mg total) 2 (two) times daily for 3 days, THEN 1 capsule (300 mg total) 3 (three) times daily for 3 days, THEN 2 capsules (600 mg total) 2 (two) times daily for 3 days,  THEN 2 capsules (600 mg total) 3 (three) times daily for 18 days., Disp: 138 capsule, Rfl: 0    pantoprazole (PROTONIX) 40 MG tablet, Take 1 tablet (40 mg total) by mouth once daily., Disp: 90 tablet, Rfl: 0   Review of patient's allergies indicates:  No Known Allergies   Social History     Tobacco Use    Smoking status: Former     Current packs/day: 0.00     Average packs/day: 1 pack/day for 0.2 years (0.2 ttl pk-yrs)     Types: Cigars, Cigarettes     Start date: 2014     Quit date: 10/16/2014     Years since quittin.6    Smokeless tobacco: Never   Substance Use Topics    Alcohol use: Yes     Alcohol/week: 0.0 standard drinks of alcohol     Comment: occassionally      Family History   Problem Relation Name Age of Onset    Breast cancer Mother          dx <50    Stroke Father      Hypertension Father      Diabetes Brother      Hypertension Maternal Grandfather      Hypertension Paternal Grandmother      Diabetes Paternal Grandmother      Breast cancer Maternal Aunt          dx <50    Diabetes Maternal Aunt      Diabetes Maternal Uncle      Diabetes Paternal Aunt      Ovarian cancer Neg Hx          Review of Systems   Integumentary:  Negative for color change, rash, mole/lesion, breast mass, breast discharge and breast tenderness.   Breast: Negative for mass and tenderness       Physical Exam   Constitutional: She is cooperative.   HENT:   Head: Normocephalic.   Pulmonary/Chest: She exhibits no mass. Right breast exhibits no mass, no skin change and no tenderness. Left breast exhibits no mass, no skin change and no tenderness. No breast swelling.   Bilateral breasts are surgically absent and are without signs of malignancy/recurrence.   Abdominal: Normal appearance.   Genitourinary: No breast swelling.   Musculoskeletal: Lymphadenopathy:      Upper Body:      Right upper body: No supraclavicular or axillary adenopathy.      Left upper body: No supraclavicular or axillary adenopathy.     Neurological: She is  alert.   Skin: No rash noted.          ASSESSMENT and PLAN OF CARE     1. Malignant neoplasm of upper-outer quadrant of left breast in female, estrogen receptor negative  Assessment & Plan:  She is doing well and will continue to be followed according to NCCN Guidelines. She understands that her exams have remained stable and is comfortable being followed in a conservative fashion. She understands the importance of monthly self-breast examination and knows to report any and all changes as they occur.    Labs obtained today: CBC, Chem 12, CEA, LDH, CA 27.29 - after resulted, these will be compared to her previous values and all abnormal values will be phoned to her for discussion.      Orders:  -     Cancer Antigen 27-29  -     Comprehensive Metabolic Panel  -     Lactate Dehydrogenase  -     CEA  -     CBC Auto Differential    2. 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        Medical Decision Making: Medical Decision Making: TN - It is my impression that the patient suffers from all the listed conditions.  Each of these conditions did affect my Plan of Care and all medical decisions that were rendered.  The medical decision making was of high difficulty based on her comorbidities and her previous diagnosis of Triple Negative breast cancer, which can lead to an increased recurrence rate and pose a direct threat to her life.  Laboratory evaluations are currently pending but will be reviewed in the next 24 hours. Any further recommendations will be communicated to the patient by me.  I have reviewed and verified her allergies, list of medications, medical and surgical histories, social history, and a pertinent review of symptoms.     Follow up:  3 Months and PRN     For:Physical Examination, CXR, and LABS

## 2024-06-11 LAB
ALBUMIN SERPL BCP-MCNC: 4.3 G/DL (ref 3.5–5.2)
ALP SERPL-CCNC: 92 U/L (ref 55–135)
ALT SERPL W/O P-5'-P-CCNC: 16 U/L (ref 10–44)
ANION GAP SERPL CALC-SCNC: 15 MMOL/L (ref 8–16)
AST SERPL-CCNC: 14 U/L (ref 10–40)
BILIRUB SERPL-MCNC: 0.2 MG/DL (ref 0.1–1)
BUN SERPL-MCNC: 11 MG/DL (ref 6–20)
CALCIUM SERPL-MCNC: 9.9 MG/DL (ref 8.7–10.5)
CEA SERPL-MCNC: 2.2 NG/ML (ref 0–5)
CHLORIDE SERPL-SCNC: 101 MMOL/L (ref 95–110)
CO2 SERPL-SCNC: 24 MMOL/L (ref 23–29)
CREAT SERPL-MCNC: 0.7 MG/DL (ref 0.5–1.4)
EST. GFR  (NO RACE VARIABLE): >60 ML/MIN/1.73 M^2
GLUCOSE SERPL-MCNC: 161 MG/DL (ref 70–110)
LDH SERPL L TO P-CCNC: 138 U/L (ref 110–260)
POTASSIUM SERPL-SCNC: 3.7 MMOL/L (ref 3.5–5.1)
PROT SERPL-MCNC: 7.6 G/DL (ref 6–8.4)
SODIUM SERPL-SCNC: 140 MMOL/L (ref 136–145)

## 2024-06-13 LAB — CANCER AG27-29 SERPL-ACNC: 13.8 U/ML

## 2024-09-02 DIAGNOSIS — C50.412 MALIGNANT NEOPLASM OF UPPER-OUTER QUADRANT OF LEFT BREAST IN FEMALE, ESTROGEN RECEPTOR NEGATIVE: Primary | ICD-10-CM

## 2024-09-02 DIAGNOSIS — Z17.1 MALIGNANT NEOPLASM OF UPPER-OUTER QUADRANT OF LEFT BREAST IN FEMALE, ESTROGEN RECEPTOR NEGATIVE: Primary | ICD-10-CM

## 2024-09-09 NOTE — PROGRESS NOTES
[facility]  Christiano Agee MD, FACS  Brandy Gtz NP-THEA      Date of Service: 9/10/2024    Chief Complaint:   Gonzalo Cordova is a 48 y.o. female presenting today for her 3 month follow up to her breast cancer diagnosis.  She is due for a Physical Examination and CXR.  She reports no interval changes.     History of Present Illness:   Mrs. Cordova was diagnosed with multifocal Triple Negative left breast cancer in May 2022 at the age of 46.  One SLN was positive for isolated tumor cells.  She completed alexei-adjvuant chemotherapy. She elected bilateral mastectomies that showed NEM on the right.  Left pathology showed a residual 8 and 9 mm IDC with negative margins (albeit 1 mm posteriorly).  PALB 2 Positive (2022).   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      Past Surgical History:   Procedure Laterality Date      Bilateral simple mastecomy followed by reconstruction 12/20/22 12/20/2022      Left axillary LN sonocore biopsy 5- 05/20/2022      left breast sonocore biopsy times three 5-5-2022 05/05/2022     Left SLN biopsy with Port Placement 5- 05/22/2022    BILATERAL TUBAL LIGATION  2000    CARPAL TUNNEL RELEASE Left 2014    COLONOSCOPY      ESOPHAGOGASTRODUODENOSCOPY      HYSTERECTOMY  2015    hys only; heavy cycles    LITHOTRIPSY           Current Outpatient Medications:     clotrimazole-betamethasone 1-0.05% (LOTRISONE) cream, Apply topically 2 (two) times daily., Disp: 45 g, Rfl: 0    dextroamphetamine-amphetamine (ADDERALL) 20 mg tablet, Take 1 tablet by mouth once daily., Disp: 30 tablet, Rfl: 0    empagliflozin (JARDIANCE) 25 mg tablet, Take 25 mg by mouth., Disp: , Rfl:     famotidine (PEPCID) 40 MG tablet, Take 40 mg by mouth daily as needed. , Disp: , Rfl:     fluticasone propionate (FLONASE) 50 mcg/actuation nasal spray, 1 spray (50 mcg total) by Each Nostril route once daily. (Patient not taking: Reported on 12/18/2023), Disp: 16 g, Rfl: 0    gabapentin (NEURONTIN) 300 MG capsule, Take 1 capsule (300 mg total) by mouth every evening for 3 days, THEN 1 capsule (300 mg total) 2 (two) times daily for 3 days, THEN 1 capsule (300 mg total) 3 (three) times daily for 3 days, THEN 2 capsules (600 mg total) 2 (two) times daily for 3 days, THEN 2 capsules (600 mg total) 3 (three) times daily for 18 days., Disp: 138 capsule, Rfl: 0    glyBURIDE-metformin 5-500 mg (GLUCOVANCE) 5-500 mg Tab, Take 2 tablets by mouth 2 (two) times daily with meals., Disp: 360 tablet, Rfl: 0    INVOKANA 300 mg Tab tablet, Take 1 tablet (300 mg total) by mouth once daily., Disp: 30 tablet, Rfl: 0    ipratropium (ATROVENT) 21 mcg (0.03 %) nasal spray, 2 sprays by Nasal route 2 (two) times daily., Disp: 30 mL, Rfl: 0    liraglutide 0.6 mg/0.1 mL, 18 mg/3 mL, subq PNIJ (VICTOZA 2-MARIA ISABEL) 0.6 mg/0.1 mL (18 mg/3 mL) PnIj pen, Inject 0.6 mg into the skin once daily., Disp: 9 mL, Rfl: 3    meloxicam (MOBIC) 15 MG tablet, Take 1 tablet (15 mg total) by mouth once daily., Disp: 30 tablet, Rfl: 0    methocarbamoL (ROBAXIN) 500 MG Tab, Take 1 tablet (500 mg total) by mouth 3 (three) times daily as needed (muscle spasms)., Disp: 30 tablet, Rfl: 1    OZEMPIC 0.25 mg or 0.5 mg (2 mg/3 mL) pen injector, Inject 0.5 mg into the skin every 7 days., Disp: , Rfl:     pantoprazole (PROTONIX) 40  MG tablet, Take 1 tablet (40 mg total) by mouth once daily., Disp: 90 tablet, Rfl: 0    rosuvastatin (CRESTOR) 10 MG tablet, Take 1 tablet by mouth once daily., Disp: , Rfl:     VITAMIN D2 1,250 mcg (50,000 unit) capsule, Take 1 capsule (50,000 Units total) by mouth every 7 days., Disp: 12 capsule, Rfl: 2   Review of patient's allergies indicates:  No Known Allergies   Social History     Tobacco Use    Smoking status: Former     Current packs/day: 0.00     Average packs/day: 1 pack/day for 0.2 years (0.2 ttl pk-yrs)     Types: Cigars, Cigarettes     Start date: 2014     Quit date: 10/16/2014     Years since quittin.9    Smokeless tobacco: Never   Substance Use Topics    Alcohol use: Yes     Alcohol/week: 0.0 standard drinks of alcohol     Comment: occassionally      Family History   Problem Relation Name Age of Onset    Breast cancer Mother          dx <50    Stroke Father      Hypertension Father      Diabetes Brother      Hypertension Maternal Grandfather      Hypertension Paternal Grandmother      Diabetes Paternal Grandmother      Breast cancer Maternal Aunt          dx <50    Diabetes Maternal Aunt      Diabetes Maternal Uncle      Diabetes Paternal Aunt      Ovarian cancer Neg Hx          Review of Systems   Integumentary:  Negative for color change, rash, mole/lesion, breast mass, breast discharge and breast tenderness.   Breast: Negative for mass and tenderness       Physical Exam   Constitutional: She is cooperative.   Pulmonary/Chest: She exhibits no mass. Right breast exhibits no mass, no skin change and no tenderness. Left breast exhibits no mass, no skin change and no tenderness. No breast swelling.   Left- Surgically absent with NEM/Recurrence. Right- Surgically absent with NEM/Recurrence.    Abdominal: Normal appearance.   Genitourinary: No breast swelling.   Musculoskeletal: Lymphadenopathy:      Upper Body:      Right upper body: No supraclavicular or axillary adenopathy.      Left upper  body: No supraclavicular or axillary adenopathy.     Neurological: She is alert.   Skin: No rash noted.        CXR REPORT: FINDINGS: The lungs are adequately expanded and clear. No evidence of pneumothorax or pleural effusion. Cardiac size is normal. Mediastinal contour is normal. Bones are unremarkable. Left breast tissue expander again noted.IMPRESSION: No evidence of acute cardiopulmonary disease    NOTE:::We viewed her films together at today's visit.  We discussed the multiple views obtained and the important findings.  Even benign changes were mentioned and her questions were answered.  She knows that she may receive a formal letter or report from the Radiologist.  She is to contact us if she has questions.      ASSESSMENT and PLAN OF CARE     1. Malignant neoplasm of upper-outer quadrant of left breast in female, estrogen receptor negative  Assessment & Plan:  She is doing well and will continue to be followed according to NCCN Guidelines. She understands that her imaging and exams have remained stable and is comfortable being followed in a conservative fashion. She understands the importance of monthly self-breast examination and knows to report any and all changes as they occur.    Labs obtained today: CBC, Chem 12, CEA, LDH, CA 27.29 - after resulted, these will be compared to her previous values and all abnormal values will be phoned to her for discussion.      Orders:  -     Cancer Antigen 27-29; Future; Expected date: 09/10/2024  -     Comprehensive Metabolic Panel; Future; Expected date: 09/10/2024  -     Lactate Dehydrogenase; Future; Expected date: 09/10/2024  -     CEA; Future; Expected date: 09/10/2024  -     CBC Auto Differential; Future; Expected date: 09/10/2024    2. 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      Medical Decision Making: It is my impression that this patient suffers all conditions contained in this  medical document.  Each of these conditions did affect our plan of care and my medical decision making today.  It is my opinion that the medical decision making concerning this patient was of moderate difficulty based on the aforementioned conditions.  Any further recommendations will be communicated to the patient by me.  I have reviewed and verified her allergies, list of medications, medical and surgical histories, social history, and a pertinent review of symptoms.    Follow up:  3 Months and PRN     For:Physical Examination and LABS

## 2024-09-10 ENCOUNTER — LAB VISIT (OUTPATIENT)
Dept: LAB | Facility: HOSPITAL | Age: 49
End: 2024-09-10
Attending: NURSE PRACTITIONER
Payer: COMMERCIAL

## 2024-09-10 ENCOUNTER — OFFICE VISIT (OUTPATIENT)
Dept: SURGERY | Facility: CLINIC | Age: 49
End: 2024-09-10
Payer: COMMERCIAL

## 2024-09-10 DIAGNOSIS — Z17.1 MALIGNANT NEOPLASM OF UPPER-OUTER QUADRANT OF LEFT BREAST IN FEMALE, ESTROGEN RECEPTOR NEGATIVE: ICD-10-CM

## 2024-09-10 DIAGNOSIS — Z15.01 GENETIC SUSCEPTIBILITY TO BREAST CANCER: ICD-10-CM

## 2024-09-10 DIAGNOSIS — C50.412 MALIGNANT NEOPLASM OF UPPER-OUTER QUADRANT OF LEFT BREAST IN FEMALE, ESTROGEN RECEPTOR NEGATIVE: Primary | ICD-10-CM

## 2024-09-10 DIAGNOSIS — Z17.1 MALIGNANT NEOPLASM OF UPPER-OUTER QUADRANT OF LEFT BREAST IN FEMALE, ESTROGEN RECEPTOR NEGATIVE: Primary | ICD-10-CM

## 2024-09-10 DIAGNOSIS — C50.412 MALIGNANT NEOPLASM OF UPPER-OUTER QUADRANT OF LEFT BREAST IN FEMALE, ESTROGEN RECEPTOR NEGATIVE: ICD-10-CM

## 2024-09-10 LAB
ALBUMIN SERPL BCP-MCNC: 4.4 G/DL (ref 3.5–5.2)
ALP SERPL-CCNC: 96 U/L (ref 55–135)
ALT SERPL W/O P-5'-P-CCNC: 15 U/L (ref 10–44)
ANION GAP SERPL CALC-SCNC: 13 MMOL/L (ref 8–16)
AST SERPL-CCNC: 12 U/L (ref 10–40)
BASOPHILS # BLD AUTO: 0.03 K/UL (ref 0–0.2)
BASOPHILS NFR BLD: 0.5 % (ref 0–1.9)
BILIRUB SERPL-MCNC: 0.3 MG/DL (ref 0.1–1)
BUN SERPL-MCNC: 7 MG/DL (ref 6–20)
CALCIUM SERPL-MCNC: 9.5 MG/DL (ref 8.7–10.5)
CHLORIDE SERPL-SCNC: 101 MMOL/L (ref 95–110)
CO2 SERPL-SCNC: 25 MMOL/L (ref 23–29)
CREAT SERPL-MCNC: 0.7 MG/DL (ref 0.5–1.4)
DIFFERENTIAL METHOD BLD: ABNORMAL
EOSINOPHIL # BLD AUTO: 0.1 K/UL (ref 0–0.5)
EOSINOPHIL NFR BLD: 0.9 % (ref 0–8)
ERYTHROCYTE [DISTWIDTH] IN BLOOD BY AUTOMATED COUNT: 13.6 % (ref 11.5–14.5)
EST. GFR  (NO RACE VARIABLE): >60 ML/MIN/1.73 M^2
GLUCOSE SERPL-MCNC: 196 MG/DL (ref 70–110)
HCT VFR BLD AUTO: 42.4 % (ref 37–48.5)
HGB BLD-MCNC: 13.5 G/DL (ref 12–16)
IMM GRANULOCYTES # BLD AUTO: 0.01 K/UL (ref 0–0.04)
IMM GRANULOCYTES NFR BLD AUTO: 0.2 % (ref 0–0.5)
LYMPHOCYTES # BLD AUTO: 1.8 K/UL (ref 1–4.8)
LYMPHOCYTES NFR BLD: 31.9 % (ref 18–48)
MCH RBC QN AUTO: 27.2 PG (ref 27–31)
MCHC RBC AUTO-ENTMCNC: 31.8 G/DL (ref 32–36)
MCV RBC AUTO: 85 FL (ref 82–98)
MONOCYTES # BLD AUTO: 0.4 K/UL (ref 0.3–1)
MONOCYTES NFR BLD: 7.7 % (ref 4–15)
NEUTROPHILS # BLD AUTO: 3.3 K/UL (ref 1.8–7.7)
NEUTROPHILS NFR BLD: 58.8 % (ref 38–73)
NRBC BLD-RTO: 0 /100 WBC
PLATELET # BLD AUTO: 196 K/UL (ref 150–450)
PMV BLD AUTO: 13.6 FL (ref 9.2–12.9)
POTASSIUM SERPL-SCNC: 3.4 MMOL/L (ref 3.5–5.1)
PROT SERPL-MCNC: 7.6 G/DL (ref 6–8.4)
RBC # BLD AUTO: 4.97 M/UL (ref 4–5.4)
SODIUM SERPL-SCNC: 139 MMOL/L (ref 136–145)
WBC # BLD AUTO: 5.58 K/UL (ref 3.9–12.7)

## 2024-09-10 PROCEDURE — 99999 PR PBB SHADOW E&M-EST. PATIENT-LVL III: CPT | Mod: PBBFAC,,, | Performed by: NURSE PRACTITIONER

## 2024-09-10 PROCEDURE — 86300 IMMUNOASSAY TUMOR CA 15-3: CPT | Performed by: NURSE PRACTITIONER

## 2024-09-10 PROCEDURE — 83615 LACTATE (LD) (LDH) ENZYME: CPT | Performed by: NURSE PRACTITIONER

## 2024-09-10 PROCEDURE — 80053 COMPREHEN METABOLIC PANEL: CPT | Performed by: NURSE PRACTITIONER

## 2024-09-10 PROCEDURE — 36415 COLL VENOUS BLD VENIPUNCTURE: CPT | Mod: PN | Performed by: NURSE PRACTITIONER

## 2024-09-10 PROCEDURE — 82378 CARCINOEMBRYONIC ANTIGEN: CPT | Performed by: NURSE PRACTITIONER

## 2024-09-10 PROCEDURE — 99214 OFFICE O/P EST MOD 30 MIN: CPT | Mod: S$GLB,,, | Performed by: NURSE PRACTITIONER

## 2024-09-10 PROCEDURE — 85025 COMPLETE CBC W/AUTO DIFF WBC: CPT | Performed by: NURSE PRACTITIONER

## 2024-09-11 LAB
CEA SERPL-MCNC: 2.1 NG/ML (ref 0–5)
LDH SERPL L TO P-CCNC: 129 U/L (ref 110–260)

## 2024-09-13 LAB — CANCER AG27-29 SERPL-ACNC: 14.3 U/ML

## 2024-12-10 ENCOUNTER — OFFICE VISIT (OUTPATIENT)
Dept: SURGERY | Facility: CLINIC | Age: 49
End: 2024-12-10
Payer: COMMERCIAL

## 2024-12-10 ENCOUNTER — LAB VISIT (OUTPATIENT)
Dept: LAB | Facility: HOSPITAL | Age: 49
End: 2024-12-10
Attending: NURSE PRACTITIONER
Payer: COMMERCIAL

## 2024-12-10 DIAGNOSIS — C50.412 MALIGNANT NEOPLASM OF UPPER-OUTER QUADRANT OF LEFT BREAST IN FEMALE, ESTROGEN RECEPTOR NEGATIVE: ICD-10-CM

## 2024-12-10 DIAGNOSIS — C50.412 MALIGNANT NEOPLASM OF UPPER-OUTER QUADRANT OF LEFT BREAST IN FEMALE, ESTROGEN RECEPTOR NEGATIVE: Primary | ICD-10-CM

## 2024-12-10 DIAGNOSIS — Z15.01 GENETIC SUSCEPTIBILITY TO BREAST CANCER: ICD-10-CM

## 2024-12-10 DIAGNOSIS — Z17.1 MALIGNANT NEOPLASM OF UPPER-OUTER QUADRANT OF LEFT BREAST IN FEMALE, ESTROGEN RECEPTOR NEGATIVE: Primary | ICD-10-CM

## 2024-12-10 DIAGNOSIS — Z17.1 MALIGNANT NEOPLASM OF UPPER-OUTER QUADRANT OF LEFT BREAST IN FEMALE, ESTROGEN RECEPTOR NEGATIVE: ICD-10-CM

## 2024-12-10 LAB
ALBUMIN SERPL BCP-MCNC: 3.9 G/DL (ref 3.5–5.2)
ALP SERPL-CCNC: 80 U/L (ref 40–150)
ALT SERPL W/O P-5'-P-CCNC: 14 U/L (ref 10–44)
ANION GAP SERPL CALC-SCNC: 13 MMOL/L (ref 8–16)
AST SERPL-CCNC: 14 U/L (ref 10–40)
BASOPHILS # BLD AUTO: 0.01 K/UL (ref 0–0.2)
BASOPHILS NFR BLD: 0.3 % (ref 0–1.9)
BILIRUB SERPL-MCNC: 0.6 MG/DL (ref 0.1–1)
BUN SERPL-MCNC: 9 MG/DL (ref 6–20)
CALCIUM SERPL-MCNC: 9.4 MG/DL (ref 8.7–10.5)
CEA SERPL-MCNC: 1.7 NG/ML (ref 0–5)
CHLORIDE SERPL-SCNC: 103 MMOL/L (ref 95–110)
CO2 SERPL-SCNC: 23 MMOL/L (ref 23–29)
CREAT SERPL-MCNC: 0.7 MG/DL (ref 0.5–1.4)
DIFFERENTIAL METHOD BLD: ABNORMAL
EOSINOPHIL # BLD AUTO: 0 K/UL (ref 0–0.5)
EOSINOPHIL NFR BLD: 1.1 % (ref 0–8)
ERYTHROCYTE [DISTWIDTH] IN BLOOD BY AUTOMATED COUNT: 13 % (ref 11.5–14.5)
EST. GFR  (NO RACE VARIABLE): >60 ML/MIN/1.73 M^2
GLUCOSE SERPL-MCNC: 243 MG/DL (ref 70–110)
HCT VFR BLD AUTO: 40.3 % (ref 37–48.5)
HGB BLD-MCNC: 12.7 G/DL (ref 12–16)
IMM GRANULOCYTES # BLD AUTO: 0 K/UL (ref 0–0.04)
IMM GRANULOCYTES NFR BLD AUTO: 0 % (ref 0–0.5)
LDH SERPL L TO P-CCNC: 121 U/L (ref 110–260)
LYMPHOCYTES # BLD AUTO: 1.2 K/UL (ref 1–4.8)
LYMPHOCYTES NFR BLD: 33.5 % (ref 18–48)
MCH RBC QN AUTO: 26.7 PG (ref 27–31)
MCHC RBC AUTO-ENTMCNC: 31.5 G/DL (ref 32–36)
MCV RBC AUTO: 85 FL (ref 82–98)
MONOCYTES # BLD AUTO: 0.3 K/UL (ref 0.3–1)
MONOCYTES NFR BLD: 8.1 % (ref 4–15)
NEUTROPHILS # BLD AUTO: 2 K/UL (ref 1.8–7.7)
NEUTROPHILS NFR BLD: 57 % (ref 38–73)
NRBC BLD-RTO: 0 /100 WBC
PLATELET # BLD AUTO: 167 K/UL (ref 150–450)
PMV BLD AUTO: 12.6 FL (ref 9.2–12.9)
POTASSIUM SERPL-SCNC: 3.6 MMOL/L (ref 3.5–5.1)
PROT SERPL-MCNC: 6.8 G/DL (ref 6–8.4)
RBC # BLD AUTO: 4.76 M/UL (ref 4–5.4)
SODIUM SERPL-SCNC: 139 MMOL/L (ref 136–145)
WBC # BLD AUTO: 3.58 K/UL (ref 3.9–12.7)

## 2024-12-10 PROCEDURE — 36415 COLL VENOUS BLD VENIPUNCTURE: CPT | Mod: PN | Performed by: NURSE PRACTITIONER

## 2024-12-10 PROCEDURE — 83615 LACTATE (LD) (LDH) ENZYME: CPT | Performed by: NURSE PRACTITIONER

## 2024-12-10 PROCEDURE — 86300 IMMUNOASSAY TUMOR CA 15-3: CPT | Performed by: NURSE PRACTITIONER

## 2024-12-10 PROCEDURE — 82378 CARCINOEMBRYONIC ANTIGEN: CPT | Performed by: NURSE PRACTITIONER

## 2024-12-10 PROCEDURE — 99999 PR PBB SHADOW E&M-EST. PATIENT-LVL III: CPT | Mod: PBBFAC,,, | Performed by: NURSE PRACTITIONER

## 2024-12-10 PROCEDURE — 80053 COMPREHEN METABOLIC PANEL: CPT | Performed by: NURSE PRACTITIONER

## 2024-12-10 PROCEDURE — 99214 OFFICE O/P EST MOD 30 MIN: CPT | Mod: S$GLB,,, | Performed by: NURSE PRACTITIONER

## 2024-12-10 PROCEDURE — 85025 COMPLETE CBC W/AUTO DIFF WBC: CPT | Performed by: NURSE PRACTITIONER

## 2024-12-10 RX ORDER — ASPIRIN 81 MG/1
81 TABLET ORAL
COMMUNITY
Start: 2023-12-28

## 2024-12-10 NOTE — PROGRESS NOTES
[facility]  Christiano Agee MD, FACS  Brandy Gtz NP-THEA      Date of Service: 12/10/2024    Chief Complaint:   Gonzalo Cordova is a 48 y.o. female presenting today for her 3 month follow up to her breast cancer diagnosis.  She is due for a Physical Examination.  She reports no interval changes.     History of Present Illness:   Mrs. Cordova was diagnosed with multifocal Triple Negative left breast cancer in May 2022 at the age of 46.  One SLN was positive for isolated tumor cells.  She completed alexei-adjvuant chemotherapy. She elected bilateral mastectomies that showed NEM on the right.  Left pathology showed a residual 8 and 9 mm IDC with negative margins (albeit 1 mm posteriorly).  PALB 2 Positive (2022).   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      Past Surgical History:   Procedure Laterality Date      Bilateral simple mastecomy followed by reconstruction 12/20/22 12/20/2022      Left axillary LN sonocore biopsy 5- 05/20/2022      left breast sonocore biopsy times three 5-5-2022 05/05/2022     Left SLN biopsy with Port Placement 5- 05/22/2022    BILATERAL TUBAL LIGATION  2000    CARPAL TUNNEL RELEASE Left 2014    COLONOSCOPY      ESOPHAGOGASTRODUODENOSCOPY      HYSTERECTOMY  2015    hys only; heavy cycles    LITHOTRIPSY           Current Outpatient Medications:     clotrimazole-betamethasone 1-0.05% (LOTRISONE) cream, Apply topically 2 (two) times daily., Disp: 45 g, Rfl: 0    dextroamphetamine-amphetamine (ADDERALL) 20 mg tablet, Take 1 tablet by mouth once daily., Disp: 30 tablet, Rfl: 0    famotidine (PEPCID) 40 MG tablet, Take 40 mg by mouth daily as needed. , Disp: , Rfl:     fluticasone propionate (FLONASE) 50 mcg/actuation nasal spray, 1 spray (50 mcg total) by Each Nostril route once daily. (Patient not taking: Reported on 12/18/2023), Disp: 16 g, Rfl: 0    gabapentin (NEURONTIN) 300 MG capsule, Take 1 capsule (300 mg total) by mouth every evening for 3 days, THEN 1 capsule (300 mg total) 2 (two) times daily for 3 days, THEN 1 capsule (300 mg total) 3 (three) times daily for 3 days, THEN 2 capsules (600 mg total) 2 (two) times daily for 3 days, THEN 2 capsules (600 mg total) 3 (three) times daily for 18 days., Disp: 138 capsule, Rfl: 0    glyBURIDE-metformin 5-500 mg (GLUCOVANCE) 5-500 mg Tab, Take 2 tablets by mouth 2 (two) times daily with meals., Disp: 360 tablet, Rfl: 0    INVOKANA 300 mg Tab tablet, Take 1 tablet (300 mg total) by mouth once daily., Disp: 30 tablet, Rfl: 0    ipratropium (ATROVENT) 21 mcg (0.03 %) nasal spray, 2 sprays by Nasal route 2 (two) times daily., Disp: 30 mL, Rfl: 0    liraglutide 0.6 mg/0.1 mL, 18 mg/3 mL, subq PNIJ (VICTOZA 2-MARIA ISABEL) 0.6 mg/0.1 mL (18 mg/3 mL) PnIj pen, Inject 0.6 mg into the skin once daily., Disp: 9 mL, Rfl: 3    meloxicam (MOBIC) 15 MG tablet, Take 1 tablet (15 mg total) by mouth once daily., Disp: 30 tablet, Rfl: 0    methocarbamoL (ROBAXIN) 500 MG Tab, Take 1 tablet (500 mg total) by mouth 3 (three) times daily as needed (muscle spasms)., Disp: 30 tablet, Rfl: 1    OZEMPIC 0.25 mg or 0.5 mg (2 mg/3 mL) pen injector, Inject 0.5 mg into the skin every 7 days., Disp: , Rfl:     pantoprazole (PROTONIX) 40 MG tablet, Take 1 tablet (40 mg total) by mouth once daily., Disp: 90 tablet,  Rfl: 0    rosuvastatin (CRESTOR) 10 MG tablet, Take 1 tablet by mouth once daily., Disp: , Rfl:     VITAMIN D2 1,250 mcg (50,000 unit) capsule, Take 1 capsule (50,000 Units total) by mouth every 7 days., Disp: 12 capsule, Rfl: 2   Review of patient's allergies indicates:  No Known Allergies   Social History     Tobacco Use    Smoking status: Former     Current packs/day: 0.00     Average packs/day: 1 pack/day for 0.2 years (0.2 ttl pk-yrs)     Types: Cigars, Cigarettes     Start date: 8/6/2014     Quit date: 10/16/2014     Years since quitting: 10.1    Smokeless tobacco: Never   Substance Use Topics    Alcohol use: Yes     Alcohol/week: 0.0 standard drinks of alcohol     Comment: occassionally      Family History   Problem Relation Name Age of Onset    Breast cancer Mother          dx <50    Stroke Father      Hypertension Father      Diabetes Brother      Hypertension Maternal Grandfather      Hypertension Paternal Grandmother      Diabetes Paternal Grandmother      Breast cancer Maternal Aunt          dx <50    Diabetes Maternal Aunt      Diabetes Maternal Uncle      Diabetes Paternal Aunt      Ovarian cancer Neg Hx          Review of Systems   Integumentary:  Negative for color change, rash, mole/lesion, breast mass, breast discharge and breast tenderness.   Breast: Negative for mass and tenderness       Physical Exam   Constitutional: She is cooperative.   Pulmonary/Chest: She exhibits no mass. Right breast exhibits no mass, no skin change and no tenderness. Left breast exhibits no mass, no skin change and no tenderness. No breast swelling.   Left- Surgically absent with NEM/Recurrence. Right- Surgically absent with NEM/Recurrence.    Abdominal: Normal appearance.   Genitourinary: No breast swelling.   Musculoskeletal: Lymphadenopathy:      Upper Body:      Right upper body: No supraclavicular or axillary adenopathy.      Left upper body: No supraclavicular or axillary adenopathy.     Neurological: She is alert.    Skin: No rash noted.          ASSESSMENT and PLAN OF CARE     1. Malignant neoplasm of upper-outer quadrant of left breast in female, estrogen receptor negative  Assessment & Plan:  She is doing well and will continue to be followed according to NCCN Guidelines. She understands that her exams have remained stable and is comfortable being followed in a conservative fashion. She understands the importance of monthly self-breast examination and knows to report any and all changes as they occur.    Labs obtained today: CBC, Chem 12, CEA, LDH, CA 27.29 - after resulted, these will be compared to her previous values and all abnormal values will be phoned to her for discussion.        2. 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      Medical Decision Making: It is my impression that this patient suffers all conditions contained in this medical document.  Each of these conditions did affect our plan of care and my medical decision making today.  It is my opinion that the medical decision making concerning this patient was of moderate difficulty based on the aforementioned conditions.  Any further recommendations will be communicated to the patient by me.  I have reviewed and verified her allergies, list of medications, medical and surgical histories, social history, and a pertinent review of symptoms.    Follow up:  3 Months and PRN     For:Physical Examination and Ultrasound with Dr. Agee

## 2024-12-12 LAB — CANCER AG27-29 SERPL-ACNC: 17.7 U/ML

## 2024-12-14 ENCOUNTER — HOSPITAL ENCOUNTER (EMERGENCY)
Age: 49
Discharge: HOME | End: 2024-12-14

## 2024-12-14 VITALS
SYSTOLIC BLOOD PRESSURE: 115 MMHG | DIASTOLIC BLOOD PRESSURE: 82 MMHG | TEMPERATURE: 97.9 F | RESPIRATION RATE: 17 BRPM | OXYGEN SATURATION: 100 % | HEART RATE: 100 BPM

## 2024-12-14 LAB
ALBUMIN SERPL-MCNC: 4.3 G/DL (ref 3.4–5)
ALBUMIN/GLOB SERPL: 1.2 {RATIO} (ref 1–2.4)
ALP SERPL-CCNC: 89 UNITS/L (ref 45–117)
ALT SERPL-CCNC: 22 UNITS/L
ANION GAP SERPL CALC-SCNC: 13 MMOL/L (ref 7–19)
AST SERPL-CCNC: 16 UNITS/L
BASOPHILS # BLD: 0 K/MCL (ref 0–0.3)
BASOPHILS NFR BLD: 1 %
BILIRUB SERPL-MCNC: 0.2 MG/DL (ref 0.2–1)
BUN SERPL-MCNC: 5 MG/DL (ref 6–20)
BUN/CREAT SERPL: 12 (ref 7–25)
CALCIUM SERPL-MCNC: 9.9 MG/DL (ref 8.4–10.2)
CHLORIDE SERPL-SCNC: 102 MMOL/L (ref 97–110)
CO2 SERPL-SCNC: 29 MMOL/L (ref 21–32)
CREAT SERPL-MCNC: 0.41 MG/DL (ref 0.51–0.95)
DEPRECATED RDW RBC: 39.5 FL (ref 39–50)
EGFRCR SERPLBLD CKD-EPI 2021: >90 ML/MIN/{1.73_M2}
EOSINOPHIL # BLD: 0.1 K/MCL (ref 0–0.5)
EOSINOPHIL NFR BLD: 1 %
ERYTHROCYTE [DISTWIDTH] IN BLOOD: 13.1 % (ref 11–15)
FASTING DURATION TIME PATIENT: ABNORMAL H
GLOBULIN SER-MCNC: 3.6 G/DL (ref 2–4)
GLUCOSE BLDC GLUCOMTR-MCNC: 319 MG/DL (ref 70–99)
GLUCOSE SERPL-MCNC: 299 MG/DL (ref 70–99)
HCT VFR BLD CALC: 43.2 % (ref 36–46.5)
HGB BLD-MCNC: 13.7 G/DL (ref 12–15.5)
IMM GRANULOCYTES # BLD AUTO: 0 K/MCL (ref 0–0.2)
IMM GRANULOCYTES # BLD: 0 %
LIPASE SERPL-CCNC: 42 UNITS/L (ref 15–77)
LYMPHOCYTES # BLD: 2.3 K/MCL (ref 1–4.8)
LYMPHOCYTES NFR BLD: 54 %
MCH RBC QN AUTO: 26.4 PG (ref 26–34)
MCHC RBC AUTO-ENTMCNC: 31.7 G/DL (ref 32–36.5)
MCV RBC AUTO: 83.4 FL (ref 78–100)
MONOCYTES # BLD: 0.5 K/MCL (ref 0.3–0.9)
MONOCYTES NFR BLD: 12 %
NEUTROPHILS # BLD: 1.4 K/MCL (ref 1.8–7.7)
NEUTROPHILS NFR BLD: 32 %
NRBC BLD MANUAL-RTO: 0 /100 WBC
PLATELET # BLD AUTO: 209 K/MCL (ref 140–450)
POTASSIUM SERPL-SCNC: 3.9 MMOL/L (ref 3.4–5.1)
PROT SERPL-MCNC: 7.9 G/DL (ref 6.4–8.2)
RBC # BLD: 5.18 MIL/MCL (ref 4–5.2)
SODIUM SERPL-SCNC: 140 MMOL/L (ref 135–145)
WBC # BLD: 4.3 K/MCL (ref 4.2–11)

## 2024-12-14 PROCEDURE — 85025 COMPLETE CBC W/AUTO DIFF WBC: CPT | Performed by: STUDENT IN AN ORGANIZED HEALTH CARE EDUCATION/TRAINING PROGRAM

## 2024-12-14 PROCEDURE — 36415 COLL VENOUS BLD VENIPUNCTURE: CPT

## 2024-12-14 PROCEDURE — 10003627 HB COUNTER ED NO SERVICE

## 2024-12-14 PROCEDURE — 82962 GLUCOSE BLOOD TEST: CPT

## 2024-12-14 PROCEDURE — 80053 COMPREHEN METABOLIC PANEL: CPT | Performed by: STUDENT IN AN ORGANIZED HEALTH CARE EDUCATION/TRAINING PROGRAM

## 2024-12-14 PROCEDURE — 83690 ASSAY OF LIPASE: CPT | Performed by: STUDENT IN AN ORGANIZED HEALTH CARE EDUCATION/TRAINING PROGRAM
